# Patient Record
Sex: FEMALE | Race: BLACK OR AFRICAN AMERICAN | Employment: FULL TIME | URBAN - METROPOLITAN AREA
[De-identification: names, ages, dates, MRNs, and addresses within clinical notes are randomized per-mention and may not be internally consistent; named-entity substitution may affect disease eponyms.]

---

## 2017-12-14 ENCOUNTER — OFFICE VISIT (OUTPATIENT)
Dept: INTERNAL MEDICINE CLINIC | Facility: CLINIC | Age: 27
End: 2017-12-14

## 2017-12-14 VITALS
TEMPERATURE: 96.3 F | DIASTOLIC BLOOD PRESSURE: 75 MMHG | OXYGEN SATURATION: 100 % | RESPIRATION RATE: 18 BRPM | BODY MASS INDEX: 26.84 KG/M2 | SYSTOLIC BLOOD PRESSURE: 124 MMHG | HEART RATE: 71 BPM | HEIGHT: 69 IN | WEIGHT: 181.2 LBS

## 2017-12-14 DIAGNOSIS — J01.90 ACUTE NON-RECURRENT SINUSITIS, UNSPECIFIED LOCATION: ICD-10-CM

## 2017-12-14 DIAGNOSIS — J45.21 MILD INTERMITTENT ASTHMA WITH ACUTE EXACERBATION: Primary | ICD-10-CM

## 2017-12-14 DIAGNOSIS — E66.3 OVERWEIGHT (BMI 25.0-29.9): ICD-10-CM

## 2017-12-14 DIAGNOSIS — J30.89 CHRONIC NON-SEASONAL ALLERGIC RHINITIS, UNSPECIFIED TRIGGER: ICD-10-CM

## 2017-12-14 RX ORDER — LORATADINE 10 MG/1
10 TABLET ORAL
COMMUNITY
Start: 2017-12-14 | End: 2018-05-18

## 2017-12-14 RX ORDER — LEVALBUTEROL TARTRATE 45 UG/1
2 AEROSOL, METERED ORAL
Qty: 1 INHALER | Refills: 5 | Status: SHIPPED | OUTPATIENT
Start: 2017-12-14 | End: 2018-05-18

## 2017-12-14 RX ORDER — DOXYCYCLINE 100 MG/1
100 CAPSULE ORAL 2 TIMES DAILY
Qty: 14 CAP | Refills: 0 | Status: SHIPPED | OUTPATIENT
Start: 2017-12-14 | End: 2018-01-29

## 2017-12-14 NOTE — PROGRESS NOTES
Chief Complaint   Patient presents with    New Patient     Patient is Coughing and states she has chest congestion. Room 7     1. Have you been to the ER, urgent care clinic since your last visit? Hospitalized since your last visit? No    2. Have you seen or consulted any other health care providers outside of the [address] [place] [address] since your last visit? Include any pap smears or colon screening.  No     Health Maintenance Due   Topic Date Due    HPV AGE 9Y-34Y (1 of 3 - Female 3 Dose Series) 01/24/2002    DTaP/Tdap/Td series (1 - Tdap) 01/24/2012    PAP AKA CERVICAL CYTOLOGY  01/24/2012    Influenza Age 9 to Adult  08/01/2017

## 2017-12-14 NOTE — PROGRESS NOTES
HISTORY OF PRESENT ILLNESS  Agnes Knowles is a 33 y.o. female. New patient - new to insurance. Found us via insurance's website. Chief Complaint   Patient presents with    New Patient     Patient is Coughing and states she has chest congestion. Room 7     HPI  1) Cough x 1.5 weeks. Started being productive a few days ago. SOB. Nasal congestion. No fever, but sweats occasionally. 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 Not able to exercise regularly without first using albuterol because of asthma. Feeling lightheaded and \"shakey\" after using. 3) Overweight - not exercising regularly because asthma and side effects of albuterol decrease her motivation to exercise. Review of Systems   Constitutional: Positive for malaise/fatigue. Negative for fever. HENT: Positive for congestion. Negative for ear pain. Respiratory: Positive for cough and sputum production. Negative for shortness of breath. Neurological: Positive for headaches. Endo/Heme/Allergies: Positive for environmental allergies. Physical Exam   Constitutional: She is oriented to person, place, and time. She appears well-developed and well-nourished. No distress. HENT:   Head: Normocephalic and atraumatic. Mouth/Throat: Oropharynx is clear and moist.   Bilateral TMs with fluid. No erythema. Nasal mucosa red, inflamed. Eyes: Conjunctivae are normal.   Neck: Neck supple. Cardiovascular: Normal rate, regular rhythm and normal heart sounds. Pulmonary/Chest: Effort normal and breath sounds normal.   Lymphadenopathy:     She has no cervical adenopathy. Neurological: She is alert and oriented to person, place, and time. Skin: Skin is warm and dry. Nursing note and vitals reviewed.       ASSESSMENT and PLAN    ICD-10-CM ICD-9-CM    1. Mild intermittent asthma with acute exacerbation J45.21 493.92 levalbuterol tartrate (XOPENEX) 45 mcg/actuation inhaler   2. Acute non-recurrent sinusitis, unspecified location J01.90 461.9 doxycycline (MONODOX) 100 mg capsule   3. Chronic non-seasonal allergic rhinitis, unspecified trigger J30.89 477.9 loratadine (CLARITIN) 10 mg tablet   4. Overweight (BMI 25.0-29. 9) E66.3 278.02 Discussed diet/exercise and encouraged minor weight loss.

## 2017-12-14 NOTE — MR AVS SNAPSHOT
Visit Information Date & Time Provider Department Dept. Phone Encounter #  
 12/14/2017  9:30 AM Carmelita Hannon, [address] [facility] Internal Medicine [phone] Follow-up Instructions Return for Physical when convenient. Upcoming Health Maintenance Date Due  
 HPV AGE 9Y-34Y (1 of 3 - Female 3 Dose Series) 1/24/2002 DTaP/Tdap/Td series (1 - Tdap) 1/24/2012 PAP AKA CERVICAL CYTOLOGY 1/24/2012 Allergies as of 12/14/2017  Review Complete On: 12/14/2017 By: Carmelita Hannon PA-C Severity Noted Reaction Type Reactions Pcn [Penicillins] High 09/03/2016    Anaphylaxis Banana  09/03/2016    Rash Current Immunizations  Never Reviewed No immunizations on file. Not reviewed this visit You Were Diagnosed With   
  
 Codes Comments Mild intermittent asthma with acute exacerbation    -  Primary ICD-10-CM: J45.21 ICD-9-CM: 305.96 Acute non-recurrent sinusitis, unspecified location     ICD-10-CM: J01.90 ICD-9-CM: 461.9 Chronic non-seasonal allergic rhinitis, unspecified trigger     ICD-10-CM: J30.89 ICD-9-CM: 477.9 Overweight (BMI 25.0-29. 9)     ICD-10-CM: L83.0 ICD-9-CM: 278.02 Vitals BP Pulse Temp Resp Height(growth percentile) Weight(growth percentile) 124/75 (BP 1 Location: Left arm, BP Patient Position: Sitting) 71 96.3 °F (35.7 °C) (Oral) 18 5' 9\" (1.753 m) 181 lb 3.2 oz (82.2 kg) LMP SpO2 BMI OB Status Smoking Status 11/27/2017 100% 26.76 kg/m2 Having regular periods Never Smoker Vitals History BMI and BSA Data Body Mass Index Body Surface Area  
 26.76 kg/m 2 2 m 2 Preferred Pharmacy Pharmacy Name Phone Gabriel Eckert [address] [facility] [phone] Your Updated Medication List  
  
   
This list is accurate as of: 12/14/17  9:53 AM.  Always use your most recent med list.  
  
  
  
  
 doxycycline 100 mg capsule Commonly known as:  Tejal Shed Take 1 Cap by mouth two (2) times a day. levalbuterol tartrate 45 mcg/actuation inhaler Commonly known as:  Shannen Melarase Take 2 Puffs by inhalation every four (4) hours as needed for Shortness of Breath.  
  
 loratadine 10 mg tablet Commonly known as:  Carmel Memphis Take 1 Tab by mouth. Indications: Allergic Rhinitis Prescriptions Sent to Pharmacy Refills  
 levalbuterol tartrate (XOPENEX) 45 mcg/actuation inhaler 5 Sig: Take 2 Puffs by inhalation every four (4) hours as needed for Shortness of Breath. Class: Normal  
 Pharmacy: [facility] [address] [facility] Ph #: [phone] Route: Inhalation  
 doxycycline (MONODOX) 100 mg capsule 0 Sig: Take 1 Cap by mouth two (2) times a day. Class: Normal  
 Pharmacy: [facility] [address] [facility] Ph #: [phone] Route: Oral  
  
Follow-up Instructions Return for Physical when convenient. Introducing [facility] SERVICES! Reji Buckner introduces Red Dot [employer] patient portal. Now you can access parts of your medical record, email your doctor's office, and request medication refills online. 1. In your internet browser, go to [email or web address] 2. Click on the First Time User? Click Here link in the Sign In box. You will see the New Member Sign Up page. 3. Enter your Red Dot Payment Access Code exactly as it appears below. You will not need to use this code after youve completed the sign-up process. If you do not sign up before the expiration date, you must request a new code. · [employer] Access Code: [email or web address] Expires: 3/14/2018  9:53 AM 
 
4. Enter the last four digits of your Social Security Number (xxxx) and Date of Birth (mm/dd/yyyy) as indicated and click Submit. You will be taken to the next sign-up page. 5. Create a People Power ID. This will be your People Power login ID and cannot be changed, so think of one that is secure and easy to remember. 6. Create a People Power password. You can change your password at any time. 7. Enter your Password Reset Question and Answer. This can be used at a later time if you forget your password. 8. Enter your e-mail address. You will receive e-mail notification when new information is available in [address] 9. Click Sign Up. You can now view and download portions of your medical record. 10. Click the Download Summary menu link to download a portable copy of your medical information. If you have questions, please visit the Frequently Asked Questions section of the People Power website. Remember, People Power is NOT to be used for urgent needs. For medical emergencies, dial 911. Now available from your iPhone and Android! Please provide this summary of care documentation to your next provider. Your primary care clinician is listed as NONE. If you have any questions after today's visit, please call [phone].

## 2018-01-29 ENCOUNTER — OFFICE VISIT (OUTPATIENT)
Dept: INTERNAL MEDICINE CLINIC | Facility: CLINIC | Age: 28
End: 2018-01-29

## 2018-01-29 VITALS
HEIGHT: 69 IN | WEIGHT: 178 LBS | TEMPERATURE: 98.6 F | RESPIRATION RATE: 18 BRPM | DIASTOLIC BLOOD PRESSURE: 70 MMHG | SYSTOLIC BLOOD PRESSURE: 106 MMHG | BODY MASS INDEX: 26.36 KG/M2 | HEART RATE: 83 BPM

## 2018-01-29 DIAGNOSIS — L72.3 SEBACEOUS CYST: Primary | ICD-10-CM

## 2018-01-29 DIAGNOSIS — E66.3 OVERWEIGHT (BMI 25.0-29.9): ICD-10-CM

## 2018-01-29 DIAGNOSIS — K13.0 ANGULAR CHEILITIS: ICD-10-CM

## 2018-01-29 DIAGNOSIS — Z12.4 SCREENING FOR CERVICAL CANCER: ICD-10-CM

## 2018-01-29 DIAGNOSIS — N89.8 VAGINAL DISCHARGE: ICD-10-CM

## 2018-01-29 RX ORDER — MUPIROCIN 20 MG/G
OINTMENT TOPICAL DAILY
Qty: 22 G | Refills: 0 | Status: SHIPPED | OUTPATIENT
Start: 2018-01-29 | End: 2018-05-18

## 2018-01-29 NOTE — MR AVS SNAPSHOT
[facility] 
[phone] Patient: Amita Leon MRN: [id number] GML:[birth year] Visit Information Date & Time Provider Department Dept. Phone Encounter #  
 1/29/2018  9:00 AM La Solorzano, [address] Internal Medicine [phone] Follow-up Instructions Return if symptoms worsen or fail to improve. Upcoming Health Maintenance Date Due Pneumococcal 19-64 Medium Risk (1 of 1 - PPSV23) 1/24/2010 DTaP/Tdap/Td series (1 - Tdap) 1/24/2012 PAP AKA CERVICAL CYTOLOGY 1/24/2012 Allergies as of 1/29/2018  Review Complete On: 1/29/2018 By: Maureen Sierra LPN Severity Noted Reaction Type Reactions Pcn [Penicillins] High 09/03/2016    Anaphylaxis Banana  09/03/2016    Rash Current Immunizations  Never Reviewed No immunizations on file. Not reviewed this visit You Were Diagnosed With   
  
 Codes Comments Sebaceous cyst    -  Primary ICD-10-CM: L72.3 ICD-9-CM: 706.2 Screening for cervical cancer     ICD-10-CM: Z12.4 ICD-9-CM: V76.2 Angular cheilitis     ICD-10-CM: K13.0 ICD-9-CM: 528.5 Overweight (BMI 25.0-29. 9)     ICD-10-CM: J51.1 ICD-9-CM: 278.02 Vitals BP Pulse Temp Resp Height(growth percentile) Weight(growth percentile) 106/70 83 98.6 °F (37 °C) (Oral) 18 5' 9\" (1.753 m) 178 lb (80.7 kg) LMP BMI OB Status Smoking Status 01/05/2018 (Approximate) 26.29 kg/m2 Having regular periods Never Smoker Vitals History BMI and BSA Data Body Mass Index Body Surface Area  
 26.29 kg/m 2 1.98 m 2 Preferred Pharmacy Pharmacy Name Phone Sia Sethi [address] [facility] [phone] Your Updated Medication List  
  
   
This list is accurate as of: 1/29/18  9:52 AM.  Always use your most recent med list.  
  
  
  
  
 doxycycline 100 mg capsule Commonly known as:  Truyoan Xavier Take 1 Cap by mouth two (2) times a day. levalbuterol tartrate 45 mcg/actuation inhaler Commonly known as:  Jxa Bene Take 2 Puffs by inhalation every four (4) hours as needed for Shortness of Breath.  
  
 loratadine 10 mg tablet Commonly known as:  Jaime Nicolas Take 1 Tab by mouth. Indications: Allergic Rhinitis  
  
 mupirocin 2 % ointment Commonly known as:  [facility] Apply  to affected area daily. Prescriptions Printed Refills  
 mupirocin (BACTROBAN) 2 % ointment 0 Sig: Apply  to affected area daily. Class: Print Route: Topical  
  
We Performed the Following REFERRAL TO GYNECOLOGY [REF30 Custom] Comments:  
 Or  
[facility] (at [facility]) [facility], [address] Phone: [phone] Or [facility] 
[address] [facility], [address] Phone: [phone] Follow-up Instructions Return if symptoms worsen or fail to improve. Referral Information Referral ID Referred By Referred To  
  
 [id number] Gunjan Lazaro MD   
   [place] [address] [address] Phone: [phone] Fax: [phone] Visits Status Start Date End Date 1 New Request 1/29/18 1/29/19 If your referral has a status of pending review or denied, additional information will be sent to support the outcome of this decision. Introducing [facility] & HEALTH SERVICES! Devora Hawkins introduces Pict[employer] patient portal. Now you can access parts of your medical record, email your doctor's office, and request medication refills online. 1. In your internet browser, go to [email or web address] 2. Click on the First Time User? Click Here link in the Sign In box. You will see the New Member Sign Up page. 3. Enter your Pict[employer] Access Code exactly as it appears below.  You will not need to use this code after youve completed the sign-up process. If you do not sign up before the expiration date, you must request a new code. · Mind FactoryAR Access Code: [email or web address] Expires: 3/14/2018  9:53 AM 
 
4. Enter the last four digits of your Social Security Number (xxxx) and Date of Birth (mm/dd/yyyy) as indicated and click Submit. You will be taken to the next sign-up page. 5. Create a Mind FactoryAR ID. This will be your Mind Factory[employer] login ID and cannot be changed, so think of one that is secure and easy to remember. 6. Create a Mind Factory[employer] password. You can change your password at any time. 7. Enter your Password Reset Question and Answer. This can be used at a later time if you forget your password. 8. Enter your e-mail address. You will receive e-mail notification when new information is available in [address] 9. Click Sign Up. You can now view and download portions of your medical record. 10. Click the Download Summary menu link to download a portable copy of your medical information. If you have questions, please visit the Frequently Asked Questions section of the Mind Factory[employer] website. Remember, Mind Factory[employer] is NOT to be used for urgent needs. For medical emergencies, dial 911. Now available from your iPhone and Android! Please provide this summary of care documentation to your next provider. Your primary care clinician is listed as Veto White. If you have any questions after today's visit, please call [phone].

## 2018-01-29 NOTE — PROGRESS NOTES
Chief Complaint   Patient presents with    Mouth Pain     gets little bumps around her lips that are intermitten. Just randomly appear.  Yeast Infection     believes she may have a yeast infection/keeps getting bumps on pubic area that drain. 1. Have you been to the ER, urgent care clinic since your last visit? Hospitalized since your last visit? No    2. Have you seen or consulted any other health care providers outside of the [address] [place] [address] since your last visit? Include any pap smears or colon screening.  No

## 2018-01-29 NOTE — PROGRESS NOTES
HISTORY OF PRESENT ILLNESS  Tammie White is a 33 y.o. female. Chief Complaint   Patient presents with    Mouth Pain     gets little bumps around her lips that are intermitten. Just randomly appear.  Yeast Infection     believes she may have a yeast infection/keeps getting bumps on pubic area that drain. HPI  1) Mouth pain - dryness corners of mouth - vasaline didn't help - now \"sores\" lip margin. Slightly beter today, Bumps last 3-4 days. No discharge/weeping, hard and painful. 2) Small \"Bumps\" B external labia. Bumps are painful until they \"pop\". Getting white discharge from bumps. Removing hair with a combination of Phoebe Senna and Wet razor/shaving cream. No recent change in grooming habits/products. \"Menstrual pain\" trhought the month without relation to ovulation. No vaginal discharge currently. Never sexually active with penile/vaginal intercourse. Has never had pap smear. Very anxious about pelvic exam.     3) Exercising more and changed eating habits - not eating beef/pork (and has thus cut down on meat in diet) and soda/juice. Has lost 3 lbs since last visit! Wt Readings from Last 3 Encounters:   01/29/18 178 lb (80.7 kg)   12/14/17 181 lb 3.2 oz (82.2 kg)   09/03/16 200 lb 12.8 oz (91.1 kg)       Review of Systems   Constitutional: Negative for fever. Genitourinary: Negative for dysuria, flank pain, frequency, hematuria and urgency. Skin: Negative for itching and rash. Physical Exam   Constitutional: She is oriented to person, place, and time. She appears well-developed and well-nourished. No distress. HENT:   Head: Normocephalic and atraumatic. Neck: Neck supple. No JVD present. Cardiovascular: Normal rate, regular rhythm and normal heart sounds. Pulmonary/Chest: Effort normal and breath sounds normal. No respiratory distress. Genitourinary: No vaginal discharge found. Genitourinary Comments: Pt points to a resolving ingrown hair bump L external labia.  Very mild inflammation. No ulceration or fluctuance. Speculum exam attempted with small speculum, but pt's discomfort & anxiety was too great to insert speculum and do pap. External exam is unremarkable. Vaginal swab done to check for BV/Yeast/STDs. Musculoskeletal: She exhibits no edema. Neurological: She is alert and oriented to person, place, and time. Skin: Skin is warm and dry. Angles of mouth B red, irritated. No ulceration. Lips and vermilion borders dry & scaling. Psychiatric: She has a normal mood and affect. Her behavior is normal. Judgment and thought content normal.   Nursing note and vitals reviewed. ASSESSMENT and PLAN    ICD-10-CM ICD-9-CM    1. Sebaceous cyst L72.3 706.2 mupirocin (BACTROBAN) 2 % ointment    Discussed hair removal techniques to prevent ingrown hairs. 2. Screening for cervical cancer Z12.4 V76.2 REFERRAL TO GYNECOLOGY   3. Angular cheilitis K13.0 528.5 Moisturize. Restart meat in diet. MVI daily. 4. Overweight (BMI 25.0-29. 9) E66.3 278.02 Congratulated pt on excellent progress! 5.  Vaginal discharge N89.8 623.5 NUSWAB VAGINITIS PLUS

## 2018-02-01 ENCOUNTER — TELEPHONE (OUTPATIENT)
Dept: INTERNAL MEDICINE CLINIC | Facility: CLINIC | Age: 28
End: 2018-02-01

## 2018-02-01 LAB
A VAGINAE DNA VAG QL NAA+PROBE: ABNORMAL SCORE
BVAB2 DNA VAG QL NAA+PROBE: ABNORMAL SCORE
C ALBICANS DNA VAG QL NAA+PROBE: NEGATIVE
C GLABRATA DNA VAG QL NAA+PROBE: NEGATIVE
C TRACH RRNA SPEC QL NAA+PROBE: NEGATIVE
MEGA1 DNA VAG QL NAA+PROBE: ABNORMAL SCORE
N GONORRHOEA RRNA SPEC QL NAA+PROBE: NEGATIVE
T VAGINALIS RRNA SPEC QL NAA+PROBE: NEGATIVE

## 2018-02-01 NOTE — TELEPHONE ENCOUNTER
Called pt. Informed swab \"indeterminate\" for BV - pt is not having any vaginal discharge sx and she did not appear to have vaginitis on exam. We will defer treating this unless pt develops sx. She agrees. Reviewed the remainder of pt's normal swab results.

## 2018-02-19 ENCOUNTER — TELEPHONE (OUTPATIENT)
Dept: INTERNAL MEDICINE CLINIC | Facility: CLINIC | Age: 28
End: 2018-02-19

## 2018-02-19 NOTE — TELEPHONE ENCOUNTER
Pt returned called. I informed pt that I was calling in to check on her. She stated that she went to  in [place], [place] for the chest pains. She stated that her ribcage was inflamed. She was given a steroid and Ibuprofen. She is feeling better. Pt advised to call the office by end of week if the pain is not better. Pt voiced understanding.  Everardo Carolina LPN

## 2018-02-19 NOTE — TELEPHONE ENCOUNTER
Patient called on call service for chest pain, pain with deep breath in. She was advised to go to the nearest urgent care or ED for chest xray, patient stated understanding.

## 2018-05-08 ENCOUNTER — OFFICE VISIT (OUTPATIENT)
Dept: INTERNAL MEDICINE CLINIC | Facility: CLINIC | Age: 28
End: 2018-05-08

## 2018-05-08 VITALS
BODY MASS INDEX: 26.66 KG/M2 | HEIGHT: 69 IN | SYSTOLIC BLOOD PRESSURE: 119 MMHG | HEART RATE: 80 BPM | WEIGHT: 180 LBS | DIASTOLIC BLOOD PRESSURE: 82 MMHG | TEMPERATURE: 98.6 F | OXYGEN SATURATION: 100 % | RESPIRATION RATE: 18 BRPM

## 2018-05-08 DIAGNOSIS — J45.21 MILD INTERMITTENT ASTHMA WITH ACUTE EXACERBATION: ICD-10-CM

## 2018-05-08 DIAGNOSIS — R05.9 COUGH: ICD-10-CM

## 2018-05-08 DIAGNOSIS — J30.89 CHRONIC NON-SEASONAL ALLERGIC RHINITIS: Primary | ICD-10-CM

## 2018-05-08 RX ORDER — ALBUTEROL SULFATE 0.83 MG/ML
2.5 SOLUTION RESPIRATORY (INHALATION) ONCE
Qty: 1 EACH | Refills: 0
Start: 2018-05-08 | End: 2018-05-08

## 2018-05-08 RX ORDER — BUDESONIDE AND FORMOTEROL FUMARATE DIHYDRATE 80; 4.5 UG/1; UG/1
2 AEROSOL RESPIRATORY (INHALATION) 2 TIMES DAILY
Qty: 1 INHALER | Refills: 1 | Status: SHIPPED | OUTPATIENT
Start: 2018-05-08 | End: 2019-10-08 | Stop reason: SDUPTHER

## 2018-05-08 RX ORDER — METHYLPREDNISOLONE 4 MG/1
TABLET ORAL
Qty: 1 DOSE PACK | Refills: 0 | Status: SHIPPED | OUTPATIENT
Start: 2018-05-08 | End: 2018-05-14

## 2018-05-08 RX ORDER — MINERAL OIL
180 ENEMA (ML) RECTAL DAILY
Qty: 30 TAB | Refills: 2 | Status: SHIPPED | OUTPATIENT
Start: 2018-05-08 | End: 2018-07-29

## 2018-05-08 RX ORDER — ALBUTEROL SULFATE 90 UG/1
AEROSOL, METERED RESPIRATORY (INHALATION)
COMMUNITY
End: 2020-11-02 | Stop reason: ALTCHOICE

## 2018-05-08 RX ORDER — AZITHROMYCIN 250 MG/1
250 TABLET, FILM COATED ORAL SEE ADMIN INSTRUCTIONS
Qty: 6 TAB | Refills: 0 | Status: SHIPPED | OUTPATIENT
Start: 2018-05-08 | End: 2018-05-13

## 2018-05-08 NOTE — PROGRESS NOTES
Subjective:      Umm Sifuentes is a 33 y.o. female who presents today for   Chief Complaint   Patient presents with    Cough     productive hacking cough that has worsened over the last week. Patient has past med hx of asthma. She says symptoms started week and half ago. Taking Claritin and zyrtec. She says cough has become progressively worse. She felt like she was wheezing. She has been using her albuterol much more frequently than usual. Typically she rarely uses her albuterol. She says she is coughing up a lot of mucus. Patient Active Problem List    Diagnosis Date Noted    Chronic non-seasonal allergic rhinitis 12/14/2017    Mild intermittent asthma with acute exacerbation 12/14/2017    Overweight (BMI 25.0-29.9) 12/14/2017     Current Outpatient Prescriptions   Medication Sig Dispense Refill    Cetirizine (ZYRTEC) 10 mg cap Take  by mouth.  iron/folic OHPF/R68/F/PJUNLKJM (FERRALET 90 PO) Take  by mouth.  albuterol (PROVENTIL HFA, VENTOLIN HFA, PROAIR HFA) 90 mcg/actuation inhaler Take  by inhalation.  loratadine (CLARITIN) 10 mg tablet Take 1 Tab by mouth. Indications: Allergic Rhinitis      mupirocin (BACTROBAN) 2 % ointment Apply  to affected area daily. 22 g 0    levalbuterol tartrate (XOPENEX) 45 mcg/actuation inhaler Take 2 Puffs by inhalation every four (4) hours as needed for Shortness of Breath. 1 Inhaler 5     Allergies   Allergen Reactions    Pcn [Penicillins] Anaphylaxis    Banana Rash     Past Medical History:   Diagnosis Date    Allergic rhinitis     Asthma      No past surgical history on file.   Family History   Problem Relation Age of Onset    Diabetes Mother      decsd at age 48 s/p cellulitis and amputation    Heart Failure Mother     Kidney Disease Mother     No Known Problems Father      not close    Diabetes Maternal Grandmother     Kidney Disease Maternal Grandmother      Social History   Substance Use Topics    Smoking status: Never Smoker  Smokeless tobacco: Never Used    Alcohol use No        Review of Systems    A comprehensive review of systems was negative except for that written in the HPI. Objective:     Visit Vitals    /82    Pulse 80    Temp 98.6 °F (37 °C) (Oral)    Resp 18    Ht 5' 9\" (1.753 m)    Wt 180 lb (81.6 kg)    LMP 04/28/2018    SpO2 100%    BMI 26.58 kg/m2     General:  Alert, cooperative, no distress, appears stated age. Head:  Normocephalic, without obvious abnormality, atraumatic. Eyes:  Conjunctivae/corneas clear. PERRL, EOMs intact. Fundi benign. Ears:  Normal TMs and external ear canals both ears. Nose: Nares normal. Septum midline. Mucosa normal. No drainage or sinus tenderness. Throat: Lips, mucosa, and tongue normal. Teeth and gums normal.   Neck: Supple, symmetrical, trachea midline, no adenopathy, thyroid: no enlargement/tenderness/nodules, no carotid bruit and no JVD. Back:   Symmetric, no curvature. ROM normal. No CVA tenderness. Lungs:   Clear to auscultation bilaterally. Chest wall:  No tenderness or deformity. Heart:  Regular rate and rhythm, S1, S2 normal, no murmur, click, rub or gallop. Abdomen:   Soft, non-tender. Bowel sounds normal. No masses,  No organomegaly. Extremities: Extremities normal, atraumatic, no cyanosis or edema. Pulses: 2+ and symmetric all extremities. Skin: Skin color, texture, turgor normal. No rashes or lesions. Lymph nodes: Cervical, supraclavicular, and axillary nodes normal.   Neurologic: CNII-XII intact. Normal strength, sensation and reflexes throughout. Assessment/Plan:       ICD-10-CM ICD-9-CM    1. Chronic non-seasonal allergic rhinitis J30.89 477.9 Try allegra   2. Mild intermittent asthma with acute exacerbation J45.21 493.92 ALBUTEROL, INHAL. SOL., FDA-APPROVED FINAL, NON-COMPOUND UNIT DOSE, 1 MG      INHAL RX, AIRWAY OBST/DX SPUTUM INDUCT      Albuterol as needed  symbicort daily  Medrol pack   3.  Cough R05 786.2 ALBUTEROL, INHAL. SOL., FDA-APPROVED FINAL, NON-COMPOUND UNIT DOSE, 1 MG      INHAL RX, AIRWAY OBST/DX SPUTUM INDUCT       Follow-up Disposition: Not on File   Advised her to call back or return to office if symptoms worsen/change/persist.  Discussed expected course/resolution/complications of diagnosis in detail with patient. Medication risks/benefits/costs/interactions/alternatives discussed with patient. She was given an after visit summary which includes diagnoses, current medications, & vitals. She expressed understanding with the diagnosis and plan.

## 2018-05-08 NOTE — PROGRESS NOTES
Chief Complaint   Patient presents with    Cough     productive hacking cough that has worsened over the last week. 1. Have you been to the ER, urgent care clinic since your last visit? Hospitalized since your last visit? No    2. Have you seen or consulted any other health care providers outside of the [facility] since your last visit? Include any pap smears or colon screening.  No

## 2018-05-08 NOTE — MR AVS SNAPSHOT
[address] [facility] 
[phone] Patient: Kumar Meraz MRN: [id number] BMF:8/73/3372 Visit Information Date & Time Provider Department Dept. Phone Encounter #  
 5/8/2018  8:00 AM Vic Gordon  [facility] Internal Medicine [phone] [id number] Follow-up Instructions Return if symptoms worsen or fail to improve, for follow up. Upcoming Health Maintenance Date Due Pneumococcal 19-64 Medium Risk (1 of 1 - PPSV23) 1/24/2010 DTaP/Tdap/Td series (1 - Tdap) 1/24/2012 PAP AKA CERVICAL CYTOLOGY 1/24/2012 Influenza Age 5 to Adult 8/1/2018 Allergies as of 5/8/2018  Review Complete On: 5/8/2018 By: Yesy Phillips LPN Severity Noted Reaction Type Reactions Pcn [Penicillins] High 09/03/2016    Anaphylaxis Banana  09/03/2016    Rash Current Immunizations  Never Reviewed No immunizations on file. Not reviewed this visit You Were Diagnosed With   
  
 Codes Comments Chronic non-seasonal allergic rhinitis    -  Primary ICD-10-CM: J30.89 ICD-9-CM: 477.9 Mild intermittent asthma with acute exacerbation     ICD-10-CM: J45.21 ICD-9-CM: 396.24 Cough     ICD-10-CM: R05 ICD-9-CM: 937. 2 Vitals BP Pulse Temp Resp Height(growth percentile) Weight(growth percentile) 119/82 80 98.6 °F (37 °C) (Oral) 18 5' 9\" (1.753 m) 180 lb (81.6 kg) LMP SpO2 BMI OB Status Smoking Status 04/28/2018 100% 26.58 kg/m2 Having regular periods Never Smoker Vitals History BMI and BSA Data Body Mass Index Body Surface Area  
 26.58 kg/m 2 1.99 m 2 Preferred Pharmacy Pharmacy Name Phone Ana Thompson [id number] [facility] [phone] Your Updated Medication List  
  
   
This list is accurate as of 5/8/18  8:53 AM.  Always use your most recent med list.  
  
  
  
  
 * albuterol 90 mcg/actuation inhaler Commonly known as:  PROVENTIL HFA, VENTOLIN HFA, PROAIR HFA Take  by inhalation. * albuterol 2.5 mg /3 mL (0.083 %) nebulizer solution Commonly known as:  PROVENTIL VENTOLIN  
3 mL by Nebulization route once for 1 dose. azithromycin 250 mg tablet Commonly known as:  Nir Ship Take 1 Tab by mouth See Admin Instructions for 5 days. budesonide-formoterol 80-4.5 mcg/actuation Hfaa Commonly known as:  SYMBICORT Take 2 Puffs by inhalation two (2) times a day. FERRALET 90 PO Take  by mouth. fexofenadine 180 mg tablet Commonly known as:  Lennis Ruiz Take 1 Tab by mouth daily. levalbuterol tartrate 45 mcg/actuation inhaler Commonly known as:  Jazmin Nab Take 2 Puffs by inhalation every four (4) hours as needed for Shortness of Breath.  
  
 loratadine 10 mg tablet Commonly known as:  Curlee Arms Take 1 Tab by mouth. Indications: Allergic Rhinitis  
  
 methylPREDNISolone 4 mg tablet Commonly known as:  MEDROL DOSEPACK  
use as directed  
  
 mupirocin 2 % ointment Commonly known as:  [facility] Apply  to affected area daily. ZyrTEC 10 mg Cap Generic drug:  Cetirizine Take  by mouth. * Notice: This list has 2 medication(s) that are the same as other medications prescribed for you. Read the directions carefully, and ask your doctor or other care provider to review them with you. Prescriptions Sent to Pharmacy Refills  
 fexofenadine (ALLEGRA) 180 mg tablet 2 Sig: Take 1 Tab by mouth daily. Class: Normal  
 Pharmacy: [address] Ph #: [phone] Route: Oral  
 azithromycin (ZITHROMAX) 250 mg tablet 0 Sig: Take 1 Tab by mouth See Admin Instructions for 5 days. Class: Normal  
 Pharmacy: [address] Ph #: [phone]  Route: Oral  
 budesonide-formoterol (SYMBICORT) 80-4.5 mcg/actuation HFAA 1 Sig: Take 2 Puffs by inhalation two (2) times a day. Class: Normal  
 [facility] [address] [facility] Ph #: [phone] Route: Inhalation  
 methylPREDNISolone (MEDROL DOSEPACK) 4 mg tablet 0 Sig: use as directed Class: Normal  
 Pharmacy: [facility] [address] [facility] Ph #: [phone] We Performed the Following ALBUTEROL, INHAL. SOL., FDA-APPROVED FINAL, NON-COMPOUND UNIT DOSE, 1 MG [ [facility]] INHAL RX, AIRWAY OBST/DX SPUTUM INDUCT [id number] CPT(R)] Follow-up Instructions Return if symptoms worsen or fail to improve, for follow up. Introducing [facility] SERVICES! Sánchez Benz introduces [employer] patient portal. Now you can access parts of your medical record, email your doctor's office, and request medication refills online. 1. In your internet browser, go to [email or web address] 2. Click on the First Time User? Click Here link in the Sign In box. You will see the New Member Sign Up page. 3. Enter your [employer] Access Code exactly as it appears below. You will not need to use this code after youve completed the sign-up process. If you do not sign up before the expiration date, you must request a new code. · [employer] Access Code: [email or web address] Expires: 8/6/2018  8:53 AM 
 
4. Enter the last four digits of your Social Security Number (xxxx) and Date of Birth (mm/dd/yyyy) as indicated and click Submit. You will be taken to the next sign-up page. 5. Create a myDrugCostst ID. This will be your Q1Media login ID and cannot be changed, so think of one that is secure and easy to remember. 6. Create a [employer] password. You can change your password at any time. 7. Enter your Password Reset Question and Answer. This can be used at a later time if you forget your password. 8. Enter your e-mail address. You will receive e-mail notification when new information is available in [address] 9. Click Sign Up. You can now view and download portions of your medical record. 10. Click the Download Summary menu link to download a portable copy of your medical information. If you have questions, please visit the Frequently Asked Questions section of the [employer] website. Remember, [employer] is NOT to be used for urgent needs. For medical emergencies, dial 911. Now available from your iPhone and Android! Please provide this summary of care documentation to your next provider. Your primary care clinician is listed as Angle Journey. If you have any questions after today's visit, please call [phone].

## 2018-07-27 ENCOUNTER — OFFICE VISIT (OUTPATIENT)
Dept: INTERNAL MEDICINE CLINIC | Facility: CLINIC | Age: 28
End: 2018-07-27

## 2018-07-27 VITALS
RESPIRATION RATE: 18 BRPM | HEART RATE: 85 BPM | WEIGHT: 177 LBS | SYSTOLIC BLOOD PRESSURE: 115 MMHG | DIASTOLIC BLOOD PRESSURE: 79 MMHG | BODY MASS INDEX: 26.83 KG/M2 | HEIGHT: 68 IN | TEMPERATURE: 97.1 F

## 2018-07-27 DIAGNOSIS — D50.9 IRON DEFICIENCY ANEMIA, UNSPECIFIED IRON DEFICIENCY ANEMIA TYPE: Primary | ICD-10-CM

## 2018-07-27 DIAGNOSIS — R10.9 RIGHT FLANK PAIN: ICD-10-CM

## 2018-07-27 LAB
BILIRUB UR QL STRIP: NEGATIVE
GLUCOSE UR-MCNC: NEGATIVE MG/DL
KETONES P FAST UR STRIP-MCNC: NEGATIVE MG/DL
PH UR STRIP: 6.5 [PH] (ref 4.6–8)
PROT UR QL STRIP: NEGATIVE
SP GR UR STRIP: 1.02 (ref 1–1.03)
UA UROBILINOGEN AMB POC: NORMAL (ref 0.2–1)
URINALYSIS CLARITY POC: CLEAR
URINALYSIS COLOR POC: YELLOW
URINE BLOOD POC: NORMAL
URINE LEUKOCYTES POC: NORMAL
URINE NITRITES POC: NEGATIVE

## 2018-07-27 RX ORDER — NITROFURANTOIN 25; 75 MG/1; MG/1
100 CAPSULE ORAL 2 TIMES DAILY
Qty: 14 CAP | Refills: 0 | Status: SHIPPED | OUTPATIENT
Start: 2018-07-27 | End: 2019-01-09 | Stop reason: ALTCHOICE

## 2018-07-27 NOTE — MR AVS SNAPSHOT
Vijaya Ji 
 
 
 [facility] 
[phone] Patient: Jenn Bolivar MRN: [id number] [facility]:[birth year] Visit Information Date & Time Provider Department Dept. Phone Encounter #  
 7/27/2018  9:30 AM Elvin Powers MD [address] [place] [address] Internal Medicine [phone] [id number] Follow-up Instructions Return if symptoms worsen or fail to improve, for follow up. Upcoming Health Maintenance Date Due Pneumococcal 19-64 Medium Risk (1 of 1 - PPSV23) 1/24/2010 DTaP/Tdap/Td series (1 - Tdap) 1/24/2012 PAP AKA CERVICAL CYTOLOGY 1/24/2012 Influenza Age 5 to Adult 8/1/2018 Allergies as of 7/27/2018  Review Complete On: 7/27/2018 By: Daphne Dodd LPN Severity Noted Reaction Type Reactions Pcn [Penicillins] High 09/03/2016    Anaphylaxis Banana  09/03/2016    Rash Current Immunizations  Never Reviewed No immunizations on file. Not reviewed this visit You Were Diagnosed With   
  
 Codes Comments Iron deficiency anemia, unspecified iron deficiency anemia type    -  Primary ICD-10-CM: D50.9 ICD-9-CM: 280.9 Right flank pain     ICD-10-CM: R10.9 ICD-9-CM: 789.09 Vitals BP Pulse Temp Resp Height(growth percentile) Weight(growth percentile) 115/79 85 97.1 °F (36.2 °C) (Oral) 18 5' 8\" (1.727 m) 177 lb (80.3 kg) LMP BMI OB Status Smoking Status 07/11/2018 (Approximate) 26.91 kg/m2 Having regular periods Never Smoker Vitals History BMI and BSA Data Body Mass Index Body Surface Area  
 26.91 kg/m 2 1.96 m 2 Preferred Pharmacy Pharmacy Name Phone Dixie Gilmore [address] [facility] [phone] Your Updated Medication List  
  
   
This list is accurate as of 7/27/18 10:40 AM.  Always use your most recent med list.  
  
  
  
  
 albuterol 90 mcg/actuation inhaler Commonly known as:  PROVENTIL HFA, VENTOLIN HFA, PROAIR HFA Take  by inhalation. budesonide-formoterol 80-4.5 mcg/actuation Hfaa Commonly known as:  SYMBICORT Take 2 Puffs by inhalation two (2) times a day. FERRALET 90 PO Take  by mouth. fexofenadine 180 mg tablet Commonly known as:  Avonne Day Take 1 Tab by mouth daily. Iron, Cbn & Gluc-FA-B12-C-DSS 90-1-12-50 mg-mg-mcg-mg Tab Commonly known as:  FERRALET 90 DUAL-IRON DELIVERY Take 1 Each by mouth daily. nitrofurantoin (macrocrystal-monohydrate) 100 mg capsule Commonly known as:  MACROBID Take 1 Cap by mouth two (2) times a day. Prescriptions Sent to Pharmacy Refills Iron, Cbn & Gluc-FA-B12-C-DSS (FERRALET 90 DUAL-IRON DELIVERY) 90-1-12-50 mg-mg-mcg-mg tab 6 Sig: Take 1 Each by mouth daily. Class: Normal  
 Pharmacy: [facility] [address], [place] Ph #: [phone] Route: Oral  
 nitrofurantoin, macrocrystal-monohydrate, (MACROBID) 100 mg capsule 0 Sig: Take 1 Cap by mouth two (2) times a day. Class: Normal  
 Pharmacy: [facility] [address], [place] Ph #: [phone] Route: Oral  
  
We Performed the Following AMB POC URINALYSIS DIP STICK AUTO W/O MICRO [48286 CPT(R)] CULTURE, URINE [id number] CPT(R)] Follow-up Instructions Return if symptoms worsen or fail to improve, for follow up. Introducing [facility] & HEALTH SERVICES! Xavier Martinez introduces Twitter patient portal. Now you can access parts of your medical record, email your doctor's office, and request medication refills online. 1. In your internet browser, go to [email or web address] 2. Click on the First Time User? Click Here link in the Sign In box. You will see the New Member Sign Up page. 3. Enter your Twitter Access Code exactly as it appears below.  You will not need to use this code after youve completed the sign-up process. If you do not sign up before the expiration date, you must request a new code. · [employer] Access Code: [email or web address] Expires: 8/6/2018  8:53 AM 
 
4. Enter the last four digits of your Social Security Number (xxxx) and Date of Birth (mm/dd/yyyy) as indicated and click Submit. You will be taken to the next sign-up page. 5. Create a [employer] ID. This will be your [employer] login ID and cannot be changed, so think of one that is secure and easy to remember. 6. Create a [employer] password. You can change your password at any time. 7. Enter your Password Reset Question and Answer. This can be used at a later time if you forget your password. 8. Enter your e-mail address. You will receive e-mail notification when new information is available in [address] 9. Click Sign Up. You can now view and download portions of your medical record. 10. Click the Download Summary menu link to download a portable copy of your medical information. If you have questions, please visit the Frequently Asked Questions section of the [employer] website. Remember, [employer] is NOT to be used for urgent needs. For medical emergencies, dial 911. Now available from your iPhone and Android! Please provide this summary of care documentation to your next provider. Your primary care clinician is listed as Wendie Rene. If you have any questions after today's visit, please call [phone].

## 2018-07-27 NOTE — PROGRESS NOTES
Subjective:      Endy Miller is a 33 y.o. female who presents today for   Chief Complaint   Patient presents with    Medication Evaluation    Flank Pain     bilateral flank pain. Iron deficiency anemia- has seen hematologist  She takes one iron supplement daily    Patient concerned about rt sided flank pian, comes and goes, pain is sharp and appears to be getting worse, no fever or chills. Patient Active Problem List    Diagnosis Date Noted    Chronic non-seasonal allergic rhinitis 12/14/2017    Mild intermittent asthma with acute exacerbation 12/14/2017    Overweight (BMI 25.0-29.9) 12/14/2017     Current Outpatient Prescriptions   Medication Sig Dispense Refill    iron/folic LBDL/A64/Z/DFARTCXX (FERRALET 90 PO) Take  by mouth.  albuterol (PROVENTIL HFA, VENTOLIN HFA, PROAIR HFA) 90 mcg/actuation inhaler Take  by inhalation.  budesonide-formoterol (SYMBICORT) 80-4.5 mcg/actuation HFAA Take 2 Puffs by inhalation two (2) times a day. 1 Inhaler 1    fexofenadine (ALLEGRA) 180 mg tablet Take 1 Tab by mouth daily. 30 Tab 2     Allergies   Allergen Reactions    Pcn [Penicillins] Anaphylaxis    Banana Rash     Past Medical History:   Diagnosis Date    Allergic rhinitis     Asthma      No past surgical history on file. Family History   Problem Relation Age of Onset    Diabetes Mother      decsd at age 48 s/p cellulitis and amputation    Heart Failure Mother     Kidney Disease Mother     No Known Problems Father      not close    Diabetes Maternal Grandmother     Kidney Disease Maternal Grandmother      Social History   Substance Use Topics    Smoking status: Never Smoker    Smokeless tobacco: Never Used    Alcohol use No        Review of Systems    A comprehensive review of systems was negative except for that written in the HPI.      Objective:     Visit Vitals    /79    Pulse 85    Temp 97.1 °F (36.2 °C) (Oral)    Resp 18    Ht 5' 8\" (1.727 m)    Wt 177 lb (80.3 kg)    LMP 07/11/2018 (Approximate)    BMI 26.91 kg/m2     General:  Alert, cooperative, no distress, appears stated age. Head:  Normocephalic, without obvious abnormality, atraumatic. Eyes:  Conjunctivae/corneas clear. PERRL, EOMs intact. Fundi benign. Ears:  Normal TMs and external ear canals both ears. Nose: Nares normal. Septum midline. Mucosa normal. No drainage or sinus tenderness. Throat: Lips, mucosa, and tongue normal. Teeth and gums normal.   Neck: Supple, symmetrical, trachea midline, no adenopathy, thyroid: no enlargement/tenderness/nodules, no carotid bruit and no JVD. Back:   Symmetric, no curvature. ROM normal. No CVA tenderness. Lungs:   Clear to auscultation bilaterally. Chest wall:  No tenderness or deformity. Heart:  Regular rate and rhythm, S1, S2 normal, no murmur, click, rub or gallop. Abdomen:   Soft, non-tender. Bowel sounds normal. No masses,  No organomegaly. Assessment/Plan:       ICD-10-CM ICD-9-CM    1. Iron deficiency anemia, unspecified iron deficiency anemia type D50.9 280.9 Refill prescription iron supplement   2. Right flank pain    UTI vs stone vs muscle spasm R10.9 789.09 AMB POC URINALYSIS DIP STICK AUTO W/O MICRO      CULTURE, URINE  Appears to be a mild UTI  Will treat empirically with macrobid and send culture  Increase water intake    If symptoms exacerbate or develops fever go to ER       Follow-up Disposition: Not on File   Advised her to call back or return to office if symptoms worsen/change/persist.  Discussed expected course/resolution/complications of diagnosis in detail with patient. Medication risks/benefits/costs/interactions/alternatives discussed with patient. She was given an after visit summary which includes diagnoses, current medications, & vitals. She expressed understanding with the diagnosis and plan.

## 2018-07-27 NOTE — PROGRESS NOTES
Chief Complaint   Patient presents with    Medication Evaluation    Flank Pain     bilateral flank pain. 1. Have you been to the ER, urgent care clinic since your last visit? Hospitalized since your last visit? No    2. Have you seen or consulted any other health care providers outside of the [address] [place] [address] since your last visit? Include any pap smears or colon screening.  No

## 2018-07-30 LAB
BACTERIA UR CULT: ABNORMAL
BACTERIA UR CULT: ABNORMAL

## 2018-12-19 ENCOUNTER — OFFICE VISIT (OUTPATIENT)
Dept: PRIMARY CARE CLINIC | Age: 28
End: 2018-12-19

## 2018-12-19 VITALS
HEIGHT: 68 IN | TEMPERATURE: 98 F | OXYGEN SATURATION: 99 % | WEIGHT: 186.6 LBS | SYSTOLIC BLOOD PRESSURE: 106 MMHG | BODY MASS INDEX: 28.28 KG/M2 | DIASTOLIC BLOOD PRESSURE: 71 MMHG | HEART RATE: 99 BPM | RESPIRATION RATE: 18 BRPM

## 2018-12-19 DIAGNOSIS — J45.20 MILD INTERMITTENT ASTHMA WITHOUT COMPLICATION: ICD-10-CM

## 2018-12-19 DIAGNOSIS — R10.13 EPIGASTRIC PAIN: Primary | ICD-10-CM

## 2018-12-19 DIAGNOSIS — R42 DIZZINESS: ICD-10-CM

## 2018-12-19 DIAGNOSIS — D64.9 ANEMIA, UNSPECIFIED TYPE: ICD-10-CM

## 2018-12-19 RX ORDER — OMEPRAZOLE 40 MG/1
40 CAPSULE, DELAYED RELEASE ORAL DAILY
Qty: 30 CAP | Refills: 0 | Status: SHIPPED | OUTPATIENT
Start: 2018-12-19 | End: 2019-01-18

## 2018-12-19 NOTE — PROGRESS NOTES
Chief Complaint   Patient presents with    Other     chest pressure for 2 years(EKG from 2016 in computer)     Patient went to [facility]/15/18 last week for SOB and chest \"pulling\". She was given a breathing treatment . The symptoms are worse in the morning. Visit Vitals  /71 (BP 1 Location: Left arm, BP Patient Position: Sitting)   Pulse 99   Temp 98 °F (36.7 °C) (Oral)   Resp 18   Ht 5' 8\" (1.727 m)   Wt 186 lb 9.6 oz (84.6 kg)   SpO2 99%   BMI 28.37 kg/m²     1. Have you been to the ER, urgent care clinic since your last visit? Hospitalized since your last visit? [facility] 12/15/18    2. Have you seen or consulted any other health care providers outside of the [address] [place] [address] since your last visit? Include any pap smears or colon screening. no

## 2018-12-19 NOTE — PROGRESS NOTES
Written by Bozena Carty, as dictated by Dr. Gallo Doyle MD.    Billy Reaves is a 33 y.o. female. HPI  The patient comes in today to establish care. Patient has been experiencing chest pain for almost 2 years and her other doctors are not taking her seriously. She described the pain as a pulling sensation which she experiences almost every morning. When the pain is sharp it feels like a shock and normally starts on the L side. About 1 year ago she tried not wearing a bra when sleeping which did not provide relief. The patient notes that the pain is presents during the day, but is not as noticeable. Recently she has been feeling the sharp pain more frequently. She was prescribed steroids for 1 month and did not experience relief. The patient has seen multiple doctors and was only prescribed steroids. Patient has not taken Prilosec. She has asthma and takes her inhaler as needed. Her menstrual cycle is heavy but has been anemic before she started menstruating. She is not sure if her menstrual cycle worsened her anemia. Patient does not know what type of anemia she has and does not believe she has had testing to determine the cause. Over the past week she has been feeling faint. Usually the dizziness is when she stands up, but sometimes she experiences dizziness while standing. She thought it was due to not eating well, but she has not experienced relief since she started eating more. Last blood work was done about 3 months ago by her hematologist. Her hematologist prescribed iron tablets. The patient drinks about 16 oz of water daily. She received a flu shout about 1 month ago. Patient is a teacher at [employer]. Patient Active Problem List   Diagnosis Code    Chronic non-seasonal allergic rhinitis J30.89    Mild intermittent asthma with acute exacerbation J45.21    Overweight (BMI 25.0-29. 9) E66.3        Current Outpatient Medications on File Prior to Visit   Medication Sig Dispense Refill    Iron, Cbn & Gluc-FA-B12-C-DSS (FERRALET 90 DUAL-IRON DELIVERY) 90-1-12-50 mg-mg-mcg-mg tab Take 1 Each by mouth daily. 30 Tab 6    albuterol (PROVENTIL HFA, VENTOLIN HFA, PROAIR HFA) 90 mcg/actuation inhaler Take  by inhalation.  nitrofurantoin, macrocrystal-monohydrate, (MACROBID) 100 mg capsule Take 1 Cap by mouth two (2) times a day. 14 Cap 0    iron/folic TFXU/E58/W/MWUBUFVH (FERRALET 90 PO) Take  by mouth.  budesonide-formoterol (SYMBICORT) 80-4.5 mcg/actuation HFAA Take 2 Puffs by inhalation two (2) times a day. 1 Inhaler 1     No current facility-administered medications on file prior to visit. Allergies   Allergen Reactions    Pcn [Penicillins] Anaphylaxis    Banana Rash       Past Medical History:   Diagnosis Date    Allergic rhinitis     Asthma        Family History   Problem Relation Age of Onset    Diabetes Mother         decsd at age 48 s/p cellulitis and amputation    Heart Failure Mother     Kidney Disease Mother     No Known Problems Father         not close    Diabetes Maternal Grandmother     Kidney Disease Maternal Grandmother        Social History     Socioeconomic History    Marital status: SINGLE     Spouse name: Not on file    Number of children: 0    Years of education: Not on file    Highest education level: Not on file   Social Needs    Financial resource strain: Not on file    Food insecurity - worry: Not on file    Food insecurity - inability: Not on file   [employer] needs - medical: Not on file   [employer] needs - non-medical: Not on file   Occupational History    Occupation:    Tobacco Use    Smoking status: Never Smoker    Smokeless tobacco: Never Used   Substance and Sexual Activity    Alcohol use: No    Drug use: No    Sexual activity: No   Other Topics Concern    Not on file   Social History Narrative    12/14/17        Lives with 3 roommates.  Feeling safe at home. No pets       Review of Systems   Constitutional: Negative for malaise/fatigue. HENT: Negative for congestion. Eyes: Negative for blurred vision and pain. Respiratory: Negative for cough and shortness of breath. Cardiovascular: Positive for chest pain. Negative for palpitations. Gastrointestinal: Negative for abdominal pain and heartburn. Genitourinary: Negative for frequency and urgency. Musculoskeletal: Negative for joint pain and myalgias. Neurological: Positive for dizziness. Negative for tingling, sensory change, weakness and headaches. Psychiatric/Behavioral: Negative for depression, memory loss and substance abuse. Visit Vitals  /71 (BP 1 Location: Left arm, BP Patient Position: Sitting)   Pulse 99   Temp 98 °F (36.7 °C) (Oral)   Resp 18   Ht 5' 8\" (1.727 m)   Wt 186 lb 9.6 oz (84.6 kg)   LMP 11/27/2018   SpO2 99%   BMI 28.37 kg/m²       Physical Exam   Constitutional: She is oriented to person, place, and time. She appears well-developed and well-nourished. No distress. HENT:   Right Ear: External ear normal.   Left Ear: External ear normal.   Eyes: Conjunctivae and EOM are normal. Right eye exhibits no discharge. Left eye exhibits no discharge. Neck: Normal range of motion. Neck supple. Cardiovascular: Normal rate and regular rhythm. Pulmonary/Chest: Effort normal and breath sounds normal. She has no wheezes. Abdominal: Soft. Bowel sounds are normal. There is no tenderness. Lymphadenopathy:     She has no cervical adenopathy. Neurological: She is alert and oriented to person, place, and time. Skin: She is not diaphoretic. Psychiatric: She has a normal mood and affect. Her behavior is normal.   Nursing note and vitals reviewed. ASSESSMENT and PLAN    ICD-10-CM ICD-9-CM    1. Epigastric pain R10.13 789.06 omeprazole (PRILOSEC) 40 mg capsule sent to pharmacy. Prilosec prescribed.  She should take first think in the morning and wait 30 minutes before eating. Discussed that Prilosec takes 2 weeks to work. 2. Dizziness R42 780.4 Labs ordered. Compliant on iron tablets. 3. Mild intermittent asthma without complication Q75.80 043.15 She takes her inhalers as needed. 4. Anemia, unspecified type D64.9 285.9 CBC W/O DIFF      METABOLIC PANEL, COMPREHENSIVE      TSH 3RD GENERATION      OCCULT BLOOD IMMUNOASSAY,DIAGNOSTIC    CBC, CMP, TSH, and FIT ordered. Followed by hematology and compliant on iron tablets. This plan was reviewed with the patient and patient agrees. All questions were answered. This scribe documentation was reviewed by me and accurately reflects the examination and decisions made by me. This note will not be viewable in [address]

## 2018-12-20 LAB
ALBUMIN SERPL-MCNC: 4.1 G/DL (ref 3.5–5.5)
ALBUMIN/GLOB SERPL: 1.2 {RATIO} (ref 1.2–2.2)
ALP SERPL-CCNC: 39 IU/L (ref 39–117)
ALT SERPL-CCNC: 11 IU/L (ref 0–32)
AST SERPL-CCNC: 18 IU/L (ref 0–40)
BILIRUB SERPL-MCNC: 0.3 MG/DL (ref 0–1.2)
BUN SERPL-MCNC: 11 MG/DL (ref 6–20)
BUN/CREAT SERPL: 15 (ref 9–23)
CALCIUM SERPL-MCNC: 9.4 MG/DL (ref 8.7–10.2)
CHLORIDE SERPL-SCNC: 103 MMOL/L (ref 96–106)
CO2 SERPL-SCNC: 22 MMOL/L (ref 20–29)
CREAT SERPL-MCNC: 0.72 MG/DL (ref 0.57–1)
ERYTHROCYTE [DISTWIDTH] IN BLOOD BY AUTOMATED COUNT: 15.4 % (ref 12.3–15.4)
GLOBULIN SER CALC-MCNC: 3.4 G/DL (ref 1.5–4.5)
GLUCOSE SERPL-MCNC: 119 MG/DL (ref 65–99)
HCT VFR BLD AUTO: 39.9 % (ref 34–46.6)
HGB BLD-MCNC: 12.9 G/DL (ref 11.1–15.9)
MCH RBC QN AUTO: 26.4 PG (ref 26.6–33)
MCHC RBC AUTO-ENTMCNC: 32.3 G/DL (ref 31.5–35.7)
MCV RBC AUTO: 82 FL (ref 79–97)
PLATELET # BLD AUTO: 181 X10E3/UL (ref 150–379)
POTASSIUM SERPL-SCNC: 3.8 MMOL/L (ref 3.5–5.2)
PROT SERPL-MCNC: 7.5 G/DL (ref 6–8.5)
RBC # BLD AUTO: 4.88 X10E6/UL (ref 3.77–5.28)
SODIUM SERPL-SCNC: 139 MMOL/L (ref 134–144)
TSH SERPL DL<=0.005 MIU/L-ACNC: 0.97 UIU/ML (ref 0.45–4.5)
WBC # BLD AUTO: 6.6 X10E3/UL (ref 3.4–10.8)

## 2018-12-30 LAB — HEMOCCULT STL QL IA: NEGATIVE

## 2019-01-02 ENCOUNTER — OFFICE VISIT (OUTPATIENT)
Dept: PRIMARY CARE CLINIC | Age: 29
End: 2019-01-02

## 2019-01-02 VITALS
HEIGHT: 68 IN | RESPIRATION RATE: 18 BRPM | WEIGHT: 185.8 LBS | BODY MASS INDEX: 28.16 KG/M2 | TEMPERATURE: 98.4 F | DIASTOLIC BLOOD PRESSURE: 86 MMHG | HEART RATE: 95 BPM | OXYGEN SATURATION: 96 % | SYSTOLIC BLOOD PRESSURE: 119 MMHG

## 2019-01-02 DIAGNOSIS — J45.21 MILD INTERMITTENT ASTHMA WITH ACUTE EXACERBATION: ICD-10-CM

## 2019-01-02 DIAGNOSIS — R10.13 CHRONIC EPIGASTRIC PAIN: Primary | ICD-10-CM

## 2019-01-02 DIAGNOSIS — G89.29 CHRONIC EPIGASTRIC PAIN: Primary | ICD-10-CM

## 2019-01-02 NOTE — PROGRESS NOTES
Written by Brianna Adames, as dictated by Dr. Rome Cody MD. 
[address] [facility] Don Venegas is a 33 y.o. female. HPI The patient presents today for a follow-up. Patient was first seen on 12/19 with chest pain. She has been taking Prilosec 40 mg daily as prescribed since 12/20, but she is still experiencing epigastric pain. Patient notes that the medication only seemed to make her feel queasy. The patient notes that the chest pain is worst when she goes to bed and gets up in the morning, and patient notes that she has not been sleeping well. Patient described the pain as shooting and below her breasts. The pain sometimes radiates around to her back. Patient has been using her inhalers, which do not provide relief. She has had asthma episodes this winter. Labs were drawn on 12/19: she was not fasting and her glucose was 119. CBC, CMP, and TSH were normal. FIT was negative. Patient notes that her dizziness has improved since she has been home, eating better, and drinking more water. Patient Active Problem List  
Diagnosis Code  Chronic non-seasonal allergic rhinitis J30.89  
 Mild intermittent asthma with acute exacerbation J45.21  
 Overweight (BMI 25.0-29. 9) E66.3 Current Outpatient Medications on File Prior to Visit Medication Sig Dispense Refill  omeprazole (PRILOSEC) 40 mg capsule Take 1 Cap by mouth daily for 30 days. 30 Cap 0  
 Iron, Cbn & Gluc-FA-B12-C-DSS (FERRALET 90 DUAL-IRON DELIVERY) 90-1-12-50 mg-mg-mcg-mg tab Take 1 Each by mouth daily. 30 Tab 6  
 iron/folic LWWJ/Z53/F/NVRKIBIB (FERRALET 90 PO) Take  by mouth.  albuterol (PROVENTIL HFA, VENTOLIN HFA, PROAIR HFA) 90 mcg/actuation inhaler Take  by inhalation.  budesonide-formoterol (SYMBICORT) 80-4.5 mcg/actuation HFAA Take 2 Puffs by inhalation two (2) times a day.  1 Inhaler 1  
 nitrofurantoin, macrocrystal-monohydrate, (MACROBID) 100 mg capsule Take 1 Cap by mouth two (2) times a day. 14 Cap 0 No current facility-administered medications on file prior to visit. Allergies Allergen Reactions  Pcn [Penicillins] Anaphylaxis  Banana Rash Past Medical History:  
Diagnosis Date  Allergic rhinitis  Asthma Family History Problem Relation Age of Onset  Diabetes Mother   
     decsd at age 48 s/p cellulitis and amputation  Heart Failure Mother  Kidney Disease Mother  No Known Problems Father   
     not close  Diabetes Maternal Grandmother  Kidney Disease Maternal Grandmother Social History Socioeconomic History  Marital status: SINGLE Spouse name: Not on file  Number of children: 0  
 Years of education: Not on file  Highest education level: Not on file Social Needs  Financial resource strain: Not on file  Food insecurity - worry: Not on file  Food insecurity - inability: Not on file  Transportation needs - medical: Not on file  Transportation needs - non-medical: Not on file Occupational History  Occupation:  Tobacco Use  Smoking status: Never Smoker  Smokeless tobacco: Never Used Substance and Sexual Activity  Alcohol use: No  
 Drug use: No  
 Sexual activity: No  
Other Topics Concern  Not on file Social History Narrative 12/14/17 Lives with 3 roommates. Feeling safe at home. No pets Office Visit on 12/19/2018 Component Date Value Ref Range Status  WBC 12/19/2018 6.6  3.4 - 10.8 x10E3/uL Final  
 RBC 12/19/2018 4.88  3.77 - 5.28 x10E6/uL Final  
 HGB 12/19/2018 12.9  11.1 - 15.9 g/dL Final  
 HCT 12/19/2018 39.9  34.0 - 46.6 % Final  
 MCV 12/19/2018 82  79 - 97 fL Final  
 MCH 12/19/2018 26.4* 26.6 - 33.0 pg Final  
 MCHC 12/19/2018 32.3  31.5 - 35.7 g/dL Final  
 RDW 12/19/2018 15.4  12.3 - 15.4 % Final  
 PLATELET 43/87/8886 544  150 - 379 x10E3/uL Final  
  Glucose 12/19/2018 119* 65 - 99 mg/dL Final  
 BUN 12/19/2018 11  6 - 20 mg/dL Final  
 Creatinine 12/19/2018 0.72  0.57 - 1.00 mg/dL Final  
 GFR est non-AA 12/19/2018 115  >59 mL/min/1.73 Final  
 GFR est AA 12/19/2018 133  >59 mL/min/1.73 Final  
 BUN/Creatinine ratio 12/19/2018 15  9 - 23 Final  
 Sodium 12/19/2018 139  134 - 144 mmol/L Final  
 Potassium 12/19/2018 3.8  3.5 - 5.2 mmol/L Final  
 Chloride 12/19/2018 103  96 - 106 mmol/L Final  
 CO2 12/19/2018 22  20 - 29 mmol/L Final  
 Calcium 12/19/2018 9.4  8.7 - 10.2 mg/dL Final  
 Protein, total 12/19/2018 7.5  6.0 - 8.5 g/dL Final  
 Albumin 12/19/2018 4.1  3.5 - 5.5 g/dL Final  
 GLOBULIN, TOTAL 12/19/2018 3.4  1.5 - 4.5 g/dL Final  
 A-G Ratio 12/19/2018 1.2  1.2 - 2.2 Final  
 Bilirubin, total 12/19/2018 0.3  0.0 - 1.2 mg/dL Final  
 Alk. phosphatase 12/19/2018 39  39 - 117 IU/L Final  
 AST (SGOT) 12/19/2018 18  0 - 40 IU/L Final  
 ALT (SGPT) 12/19/2018 11  0 - 32 IU/L Final  
 TSH 12/19/2018 0.968  0.450 - 4.500 uIU/mL Final  
 Occult blood fecal, by IA 12/20/2018 Negative  Negative Final  
 
 
Review of Systems Respiratory: Negative for cough and shortness of breath. Cardiovascular: Positive for chest pain. Negative for palpitations. Gastrointestinal: Positive for abdominal pain. Negative for heartburn. Musculoskeletal: Negative for joint pain and myalgias. Neurological: Negative for dizziness, tingling, sensory change and headaches. Psychiatric/Behavioral: Negative for depression, memory loss and substance abuse. Visit Vitals /86 (BP 1 Location: Left arm, BP Patient Position: Sitting) Pulse 95 Temp 98.4 °F (36.9 °C) (Oral) Resp 18 Ht 5' 8\" (1.727 m) Wt 185 lb 12.8 oz (84.3 kg) LMP 01/01/2019 SpO2 96% BMI 28.25 kg/m² Physical Exam  
Constitutional: She is oriented to person, place, and time. She appears well-developed and well-nourished. No distress.   
HENT:  
 Right Ear: External ear normal.  
Left Ear: External ear normal.  
Eyes: Conjunctivae and EOM are normal. Right eye exhibits no discharge. Left eye exhibits no discharge. Neck: Normal range of motion. Neck supple. Cardiovascular: Normal rate and regular rhythm. Pulmonary/Chest: Effort normal and breath sounds normal. She has no wheezes. Abdominal: Soft. Bowel sounds are normal. There is no tenderness. Lymphadenopathy:  
  She has no cervical adenopathy. Neurological: She is alert and oriented to person, place, and time. Skin: She is not diaphoretic. Psychiatric: She has a normal mood and affect. Her behavior is normal.  
Nursing note and vitals reviewed. ASSESSMENT and PLAN 
  ICD-10-CM ICD-9-CM 1. Chronic epigastric pain R10.13 789.06 REFERRAL TO GASTROENTEROLOGY  
 G89.29 338.29 Referred to GI. Discussed that she will likely need an endoscopy. She should not continue taking Prilosec as it has not provided relief. 2. Mild intermittent asthma with acute exacerbation J45.21 493.92 REFERRAL TO GASTROENTEROLOGY Compliant on inhalers with relief. This plan was reviewed with the patient and patient agrees. All questions were answered. This scribe documentation was reviewed by me and accurately reflects the examination and decisions made by me. This note will not be viewable in [address]

## 2019-01-02 NOTE — PROGRESS NOTES
Visit Vitals /86 (BP 1 Location: Left arm, BP Patient Position: Sitting) Pulse 95 Temp 98.4 °F (36.9 °C) (Oral) Resp 18 Ht 5' 8\" (1.727 m) Wt 185 lb 12.8 oz (84.3 kg) SpO2 96% BMI 28.25 kg/m² Chief Complaint Patient presents with  Follow-up Chest Pains 1. Have you been to the ER, urgent care clinic since your last visit? Hospitalized since your last visit? Denies 2. Have you seen or consulted any other health care providers outside of the [address] [place] [address] since your last visit? Include any pap smears or colon screening. Denies

## 2019-01-03 LAB
HBA1C MFR BLD: 5.6 % (ref 4.8–5.6)
SPECIMEN STATUS REPORT, ROLRST: NORMAL

## 2019-01-09 ENCOUNTER — OFFICE VISIT (OUTPATIENT)
Dept: PRIMARY CARE CLINIC | Age: 29
End: 2019-01-09

## 2019-01-09 ENCOUNTER — HOSPITAL ENCOUNTER (OUTPATIENT)
Dept: GENERAL RADIOLOGY | Age: 29
Discharge: HOME | End: 2019-01-09
Payer: COMMERCIAL

## 2019-01-09 VITALS
TEMPERATURE: 97.8 F | DIASTOLIC BLOOD PRESSURE: 69 MMHG | SYSTOLIC BLOOD PRESSURE: 125 MMHG | BODY MASS INDEX: 27.89 KG/M2 | OXYGEN SATURATION: 100 % | WEIGHT: 184 LBS | HEART RATE: 75 BPM | RESPIRATION RATE: 16 BRPM | HEIGHT: 68 IN

## 2019-01-09 DIAGNOSIS — R35.0 URINARY FREQUENCY: ICD-10-CM

## 2019-01-09 DIAGNOSIS — R07.1 PAINFUL BREATHING: Primary | ICD-10-CM

## 2019-01-09 DIAGNOSIS — R07.1 PAINFUL BREATHING: ICD-10-CM

## 2019-01-09 LAB
BILIRUB UR QL STRIP: NEGATIVE
GLUCOSE UR-MCNC: NEGATIVE MG/DL
KETONES P FAST UR STRIP-MCNC: NEGATIVE MG/DL
PH UR STRIP: 8 [PH] (ref 4.6–8)
PROT UR QL STRIP: NEGATIVE
SP GR UR STRIP: 1.02 (ref 1–1.03)
UA UROBILINOGEN AMB POC: NORMAL (ref 0.2–1)
URINALYSIS CLARITY POC: NORMAL
URINALYSIS COLOR POC: YELLOW
URINE BLOOD POC: NEGATIVE
URINE LEUKOCYTES POC: NEGATIVE
URINE NITRITES POC: NEGATIVE

## 2019-01-09 PROCEDURE — 71046 X-RAY EXAM CHEST 2 VIEWS: CPT

## 2019-01-09 NOTE — PROGRESS NOTES
Chief Complaint   Patient presents with   [facility] Other     states that she has a pulling and now she is getting a sharp pain when she tries to take a deep breath.  states that this has been going on for quite a while     States that she was seen by doctor dajuan for the same and still wants to see Dr Joce Llamas just could not get on her schedule today.

## 2019-01-09 NOTE — LETTER
NOTIFICATION RETURN TO WORK / SCHOOL 
 
1/9/2019 8:44 AM 
 
Ms. Carlton Oh [facility] [address] [id number] To Whom It May Concern: Carlton Oh is currently under the care of Marta Saldana. She will return to work/school on: 1/9/2019. If there are questions or concerns please have the patient contact our office.  
 
 
 
Sincerely, 
 
 
Chris Torres NP

## 2019-01-09 NOTE — PROGRESS NOTES
This note will not be viewable in [address] Jerica Raymundo is a  33 y.o. female presents for visit. Chief Complaint   Patient presents with    Rib Pain     states that she has a pulling and now she is getting a sharp pain when she tries to take a deep breath.  states that this has been going on for quite a while     HPI  Patient presents with c/o moderately sharp rib pain bilaterally with deep inspiration for past 5 days. She was evaluated by Dr. Aftab Bergman on 1/2/19 for a \"pulling\" chest/abdominal sensation and referred to GI. Appointment scheduled on 1/17/19. Denies recent trauma, fever/chills. Interested in Chest X-ray today. Also reports diarrhea this morning and urinary frequency. Review of Systems   Constitutional: Negative for chills and fever. HENT: Negative for congestion. Respiratory: Negative for cough, hemoptysis, sputum production and shortness of breath. Chest tightness -baseline with asthma   Cardiovascular: Negative for chest pain. Gastrointestinal: Positive for diarrhea. Negative for abdominal pain, constipation, heartburn, nausea and vomiting. Genitourinary: Positive for flank pain and urgency. Negative for dysuria and hematuria. Visit Vitals  /69 (BP 1 Location: Left arm, BP Patient Position: Sitting)   Pulse 75   Temp 97.8 °F (36.6 °C) (Oral)   Resp 16   Ht 5' 8\" (1.727 m)   Wt 184 lb (83.5 kg)   LMP 01/01/2019   SpO2 100%   BMI 27.98 kg/m²     Physical Exam   Constitutional: She is oriented to person, place, and time. She appears well-developed and well-nourished. No distress. HENT:   Head: Normocephalic and atraumatic. Right Ear: External ear normal.   Left Ear: External ear normal.   Eyes: Conjunctivae are normal.   Cardiovascular: Normal rate, regular rhythm and normal heart sounds. Pulmonary/Chest: Effort normal and breath sounds normal. No accessory muscle usage. No respiratory distress. She has no decreased breath sounds.  She has no wheezes. She has no rhonchi. She exhibits no tenderness. Abdominal: Soft. Normal appearance and bowel sounds are normal. There is CVA tenderness. Musculoskeletal: She exhibits no edema. Neurological: She is alert and oriented to person, place, and time. Skin: Skin is warm and dry. Psychiatric: She has a normal mood and affect. Her behavior is normal.   Nursing note and vitals reviewed. Recent Results (from the past 24 hour(s))   AMB POC URINALYSIS DIP STICK AUTO W/O MICRO    Collection Time: 01/09/19  7:36 AM   Result Value Ref Range    Color (UA POC) Yellow     Clarity (UA POC) Slightly Cloudy     Glucose (UA POC) Negative Negative    Bilirubin (UA POC) Negative Negative    Ketones (UA POC) Negative Negative    Specific gravity (UA POC) 1.025 1.001 - 1.035    Blood (UA POC) Negative Negative    pH (UA POC) 8.0 4.6 - 8.0    Protein (UA POC) Negative Negative    Urobilinogen (UA POC) 0.2 mg/dL 0.2 - 1    Nitrites (UA POC) Negative Negative    Leukocyte esterase (UA POC) Negative Negative       Patient Active Problem List    Diagnosis Date Noted    Chronic non-seasonal allergic rhinitis 12/14/2017    Mild intermittent asthma with acute exacerbation 12/14/2017    Overweight (BMI 25.0-29.9) 12/14/2017         ASSESSMENT AND PLAN:      ICD-10-CM ICD-9-CM   1. Painful breathing R07.1 786.52   2. Urinary frequency R35.0 788.41     Orders Placed This Encounter    XR CHEST PA LAT     Standing Status:   Future     Standing Expiration Date:   2/8/2020     Order Specific Question:   Reason for Exam     Answer:   moderate to severe chest pain on inspiration     Order Specific Question:   Is Patient Allergic to Contrast Dye? Answer:   No     Order Specific Question:   Is Patient Pregnant? Answer:   No    AMB POC URINALYSIS DIP STICK AUTO W/O MICRO     Diagnoses and all orders for this visit:    1. Painful breathing  -     XR CHEST PA LAT; Future    2.  Urinary frequency  -     AMB POC URINALYSIS DIP STICK AUTO W/O MICRO- UA normal        lab results and schedule of future lab studies reviewed with patient  radiology results and schedule of future radiology studies reviewed with patient  Follow up with GI and continue current treatment pending test results. Follow-up Disposition:  Return in about 4 weeks (around 2/6/2019), or if symptoms worsen or fail to improve, for chronic care. Disclaimer:  Advised her to call back or return to office if symptoms worsen/change/persist.  Discussed expected course/resolution/complications of diagnosis in detail with patient. Medication risks/benefits/alternatives discussed with patient. She was given an after visit summary which includes diagnoses, current medications, & vitals. Discussed patient instructions and advised to read to all patient instructions regarding care. She expressed understanding with the diagnosis and plan.

## 2019-01-11 NOTE — PROGRESS NOTES
Called and spoke with patient after confirming patient identifiers and advised her chest x-ray results are normal. Patient expressed thanks for the phone call. No questions or concerns voiced. End of encounter.

## 2019-01-18 ENCOUNTER — TELEPHONE (OUTPATIENT)
Dept: PRIMARY CARE CLINIC | Age: 29
End: 2019-01-18

## 2019-01-18 NOTE — TELEPHONE ENCOUNTER
Patient returns phone call and states that Dr. Stephanie Adamson office was booked and that she was referred to Dr. Migel Duke of [facility]. Requesting her most recent office notes and labs be faxed to the [facility]. Advised we would do so. No other questions or concerns voiced. Encouraged to call the office as needed. End of encounter.

## 2019-01-18 NOTE — TELEPHONE ENCOUNTER
Pt stated she needs records sent over to Dr. Demarco Dunne gastroenterology office.  Please follow up bcn: [phone]

## 2019-02-28 ENCOUNTER — HOSPITAL ENCOUNTER (EMERGENCY)
Age: 29
Discharge: HOME | End: 2019-02-28
Attending: EMERGENCY MEDICINE
Payer: COMMERCIAL

## 2019-02-28 VITALS — HEART RATE: 102 BPM | OXYGEN SATURATION: 98 %

## 2019-02-28 PROCEDURE — 75810000275 HC EMERGENCY DEPT VISIT NO LEVEL OF CARE

## 2019-02-28 NOTE — ED TRIAGE NOTES
Pt in waiting room laughing and talking on telephone, pt c/o chest pain and sob that started pta and has now resolved

## 2019-04-19 ENCOUNTER — OFFICE VISIT (OUTPATIENT)
Dept: PRIMARY CARE CLINIC | Age: 29
End: 2019-04-19

## 2019-04-19 VITALS
TEMPERATURE: 97.9 F | OXYGEN SATURATION: 100 % | DIASTOLIC BLOOD PRESSURE: 81 MMHG | RESPIRATION RATE: 18 BRPM | HEIGHT: 68 IN | BODY MASS INDEX: 28.46 KG/M2 | WEIGHT: 187.8 LBS | SYSTOLIC BLOOD PRESSURE: 122 MMHG | HEART RATE: 82 BPM

## 2019-04-19 DIAGNOSIS — J45.21 MILD INTERMITTENT ASTHMA WITH ACUTE EXACERBATION: ICD-10-CM

## 2019-04-19 DIAGNOSIS — R07.89 COSTOCHONDRAL CHEST PAIN: Primary | ICD-10-CM

## 2019-04-19 DIAGNOSIS — R42 DIZZINESS: ICD-10-CM

## 2019-04-19 DIAGNOSIS — R10.13 EPIGASTRIC PAIN: ICD-10-CM

## 2019-04-19 DIAGNOSIS — J45.909 ASTHMA DUE TO SEASONAL ALLERGIES: ICD-10-CM

## 2019-04-19 RX ORDER — OMEPRAZOLE 40 MG/1
40 CAPSULE, DELAYED RELEASE ORAL DAILY
Qty: 30 CAP | Refills: 0 | Status: SHIPPED | OUTPATIENT
Start: 2019-04-19 | End: 2019-05-19

## 2019-04-19 RX ORDER — PREDNISONE 20 MG/1
TABLET ORAL
Qty: 12 TAB | Refills: 0 | Status: SHIPPED | OUTPATIENT
Start: 2019-04-19 | End: 2019-08-30 | Stop reason: ALTCHOICE

## 2019-04-19 RX ORDER — MONTELUKAST SODIUM 10 MG/1
TABLET ORAL
COMMUNITY
Start: 2019-03-13 | End: 2020-11-02 | Stop reason: SDUPTHER

## 2019-04-19 NOTE — PROGRESS NOTES
Written by Tequila Sinclair, as dictated by Dr. Carlyn Page MD. 
 
[address] [facility] April Raya is a 30 y.o. female. HPI The patient presents today c/o persisting chest pain x 2 years. Her chest pain seems to have worsened and radiates down her back. Pt notes that she feels pain when she takes deep breaths or moves while trying to sleep. The pt took Prilosec for 2 weeks but did not experience relief. She was seen by GI and had an endoscopy which was normal. Pt was then referred to cardiology, who referred her back to allergy/immunology. She takes OTC ibuprofen for menstrual cramps, but not for her current sxs. The patient has not tried taking prednisone for this pain. 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 Patient saw an asthma specialist and had allergy testing. She reports that she was allergic to everything except mold. The patient was prescribed Singulair 10 mg and Qvar and notes that she has not had an asthma attack since then. She has not been taking OTC Allegra or Zyrtec. Patient Active Problem List  
Diagnosis Code  Chronic non-seasonal allergic rhinitis J30.89  
 Mild intermittent asthma with acute exacerbation J45.21  
 Overweight (BMI 25.0-29. 9) E66.3 Current Outpatient Medications on File Prior to Visit Medication Sig Dispense Refill  montelukast (SINGULAIR) 10 mg tablet  beclomethasone (QVAR) 80 mcg/actuation aero Take 1 Puff by inhalation two (2) times a day.  Iron, Cbn & Gluc-FA-B12-C-DSS (FERRALET 90 DUAL-IRON DELIVERY) 90-1-12-50 mg-mg-mcg-mg tab Take 1 Each by mouth daily. 30 Tab 6  
 iron/folic SFSE/R10/I/RXEBSYDW (FERRALET 90 PO) Take  by mouth.  albuterol (PROVENTIL HFA, VENTOLIN HFA, PROAIR HFA) 90 mcg/actuation inhaler Take  by inhalation.  budesonide-formoterol (SYMBICORT) 80-4.5 mcg/actuation HFAA Take 2 Puffs by inhalation two (2) times a day.  1 Inhaler 1  
 
 No current facility-administered medications on file prior to visit. Allergies Allergen Reactions  Pcn [Penicillins] Anaphylaxis  Banana Rash Past Medical History:  
Diagnosis Date  Allergic rhinitis  Asthma Family History Problem Relation Age of Onset  Diabetes Mother   
     decsd at age 48 s/p cellulitis and amputation  Heart Failure Mother  Kidney Disease Mother  No Known Problems Father   
     not close  Diabetes Maternal Grandmother  Kidney Disease Maternal Grandmother Social History Socioeconomic History  Marital status: SINGLE Spouse name: Not on file  Number of children: 0  
 Years of education: Not on file  Highest education level: Not on file Occupational History  Occupation:  Social Needs  Financial resource strain: Not on file  Food insecurity:  
  Worry: Not on file Inability: Not on file  Transportation needs:  
  Medical: Not on file Non-medical: Not on file Tobacco Use  Smoking status: Never Smoker  Smokeless tobacco: Never Used Substance and Sexual Activity  Alcohol use: No  
 Drug use: No  
 Sexual activity: Never Lifestyle  Physical activity:  
  Days per week: Not on file Minutes per session: Not on file  Stress: Not on file Relationships  Social connections:  
  Talks on phone: Not on file Gets together: Not on file Attends [employer] service: Not on file Active member of club or organization: Not on file Attends meetings of clubs or organizations: Not on file Relationship status: Not on file  Intimate partner violence:  
  Fear of current or ex partner: Not on file Emotionally abused: Not on file Physically abused: Not on file Forced sexual activity: Not on file Other Topics Concern  Not on file Social History Narrative 12/14/17 Lives with 3 roommates. Feeling safe at home. No pets Review of Systems Constitutional: Negative for malaise/fatigue. HENT: Negative for congestion. Eyes: Negative for blurred vision and pain. Respiratory: Negative for cough and shortness of breath. Cardiovascular: Positive for chest pain (radiates to back). Negative for palpitations. Gastrointestinal: Positive for heartburn. Negative for abdominal pain. Genitourinary: Negative for frequency and urgency. Musculoskeletal: Positive for back pain. Negative for joint pain and myalgias. Neurological: Positive for dizziness. Negative for tingling, sensory change, weakness and headaches. Endo/Heme/Allergies: Positive for environmental allergies. Psychiatric/Behavioral: Negative for depression, memory loss and substance abuse. Visit Vitals /81 (BP 1 Location: Right arm, BP Patient Position: Sitting) Pulse 82 Temp 97.9 °F (36.6 °C) (Oral) Resp 18 Ht 5' 8\" (1.727 m) Wt 187 lb 12.8 oz (85.2 kg) LMP 04/08/2019 SpO2 100% BMI 28.55 kg/m² Physical Exam  
Constitutional: She is oriented to person, place, and time. She appears well-developed and well-nourished. No distress. HENT:  
Right Ear: External ear normal.  
Left Ear: External ear normal.  
Eyes: Conjunctivae and EOM are normal. Right eye exhibits no discharge. Left eye exhibits no discharge. Neck: Normal range of motion. Neck supple. Cardiovascular: Normal rate and regular rhythm. Pulmonary/Chest: Effort normal and breath sounds normal. She has no wheezes. R side rib tenderness on exam  
Abdominal: Soft. Bowel sounds are normal. There is no tenderness. Lymphadenopathy:  
  She has no cervical adenopathy. Neurological: She is alert and oriented to person, place, and time. Skin: She is not diaphoretic. Psychiatric: She has a normal mood and affect. Her behavior is normal.  
Nursing note and vitals reviewed. ASSESSMENT and PLAN 
  ICD-10-CM ICD-9-CM 1. Costochondral chest pain R07.1 786.52 predniSONE (DELTASONE) 20 mg tablet sent to pharmacy. Prednisone 20 mg prescribed. Potential side effects were discussed. I want the patient to take the medication po as follows: 3 pills x 2 days, 2 pills x 2 days, 1 pill x 1 day and 1/2 pill x 1 day. The patient was advised to take this medication with food. If no improvement after finishing the medication RTC. 2. Mild intermittent asthma with acute exacerbation J45.21 493.92 Followed by allergy and doing well on Qvar. 3. Asthma due to seasonal allergies J45.909 493.90 She should continue Singulair 10 mg and start taking OTC Allegra or Zyrtec daily. 4. Epigastric pain R10.13 789.06 omeprazole (PRILOSEC) 40 mg capsule sent to pharmacy. Prilosec 40 mg prescribed. Potential side effects were discussed. She should take first thing in the morning 30 minutes before eating. 5. Dizziness R42 780.4 She should continue Singulair 10 mg and start taking OTC Allegra or Zyrtec daily. This plan was reviewed with the patient and patient agrees. All questions were answered. This scribe documentation was reviewed by me and accurately reflects the examination and decisions made by me. This note will not be viewable in [address]

## 2019-04-19 NOTE — PROGRESS NOTES
Identified pt with two pt identifiers(name and ). Reviewed record in preparation for visit and have obtained necessary documentation. All patient medications has been reviewed. Chief Complaint Patient presents with  Chest Pain  
  radiates to back  Dizziness  Medication Refill  
  pt is requesting a stronger allergy med Health Maintenance Due Topic  Pneumococcal 0-64 years (1 of 1 - PPSV23)  DTaP/Tdap/Td series (1 - Tdap)  PAP AKA CERVICAL CYTOLOGY Vitals:  
 19 1553 19 1557 BP: 139/75 122/81 Pulse: 82 Resp: 18 Temp: 97.9 °F (36.6 °C) TempSrc: Oral   
SpO2: 100% Weight: 187 lb 12.8 oz (85.2 kg) Height: 5' 8\" (1.727 m) PainSc:   8 PainLoc: Chest   
LMP: 2019 Coordination of Care Questionnaire: 
:  
1) Have you been to an emergency room, urgent care, or hospitalized since your last visit?   no    
 
2. Have seen or consulted any other health care provider since your last visit? NO 
 
3) Do you have an Advanced Directive/ Living Will in place? NO If yes, do we have a copy on file NO If no, would you like information NO Patient is accompanied by self I have received verbal consent from Brittany Santos to discuss any/all medical information while they are present in the room.

## 2019-08-30 ENCOUNTER — OFFICE VISIT (OUTPATIENT)
Dept: PRIMARY CARE CLINIC | Age: 29
End: 2019-08-30

## 2019-08-30 VITALS
HEART RATE: 112 BPM | SYSTOLIC BLOOD PRESSURE: 131 MMHG | RESPIRATION RATE: 16 BRPM | WEIGHT: 186.7 LBS | HEIGHT: 68 IN | OXYGEN SATURATION: 100 % | DIASTOLIC BLOOD PRESSURE: 89 MMHG | TEMPERATURE: 98.6 F | BODY MASS INDEX: 28.3 KG/M2

## 2019-08-30 DIAGNOSIS — S13.4XXD WHIPLASH INJURY TO NECK, SUBSEQUENT ENCOUNTER: ICD-10-CM

## 2019-08-30 DIAGNOSIS — G44.311 INTRACTABLE ACUTE POST-TRAUMATIC HEADACHE: Primary | ICD-10-CM

## 2019-08-30 DIAGNOSIS — M25.511 ACUTE PAIN OF RIGHT SHOULDER: ICD-10-CM

## 2019-08-30 RX ORDER — CYCLOBENZAPRINE HCL 10 MG
10 TABLET ORAL
Qty: 15 TAB | Refills: 0 | Status: SHIPPED | OUTPATIENT
Start: 2019-08-30 | End: 2019-09-14

## 2019-08-30 RX ORDER — PREDNISONE 20 MG/1
TABLET ORAL
Qty: 18 TAB | Refills: 0 | Status: SHIPPED | OUTPATIENT
Start: 2019-08-30 | End: 2019-09-27 | Stop reason: ALTCHOICE
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
## 2019-09-06 ENCOUNTER — OFFICE VISIT (OUTPATIENT)
Dept: PRIMARY CARE CLINIC | Age: 29
End: 2019-09-06

## 2019-09-06 ENCOUNTER — TELEPHONE (OUTPATIENT)
Dept: PRIMARY CARE CLINIC | Age: 29
End: 2019-09-06

## 2019-09-06 VITALS
BODY MASS INDEX: 28.67 KG/M2 | OXYGEN SATURATION: 100 % | HEIGHT: 68 IN | HEART RATE: 99 BPM | WEIGHT: 189.2 LBS | RESPIRATION RATE: 16 BRPM | TEMPERATURE: 98.2 F | DIASTOLIC BLOOD PRESSURE: 87 MMHG | SYSTOLIC BLOOD PRESSURE: 136 MMHG

## 2019-09-06 DIAGNOSIS — M25.511 ACUTE PAIN OF RIGHT SHOULDER DUE TO TRAUMA: Primary | ICD-10-CM

## 2019-09-06 DIAGNOSIS — E04.1 THYROID NODULE: ICD-10-CM

## 2019-09-06 DIAGNOSIS — R51.9 FREQUENT HEADACHES: ICD-10-CM

## 2019-09-06 DIAGNOSIS — G89.11 ACUTE PAIN OF RIGHT SHOULDER DUE TO TRAUMA: Primary | ICD-10-CM
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
## 2019-09-06 DIAGNOSIS — M43.6 NECK STIFFNESS: ICD-10-CM

## 2019-09-06 RX ORDER — DIFLUNISAL 500 MG/1
500 TABLET, FILM COATED ORAL 2 TIMES DAILY
Qty: 30 TAB | Refills: 0 | Status: SHIPPED | OUTPATIENT
Start: 2019-09-06 | End: 2019-09-21

## 2019-09-06 NOTE — TELEPHONE ENCOUNTER
Confirmed patient id and she states that work needs to know if she is to be out of work until she is re-evalulated on the 27th by Dr Tao Bernabe. If so she needs a letter in her chart and she can access it and it needs to say out until the eval on the 27th. Or it needs to say she can return to work now.
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
## 2019-09-06 NOTE — TELEPHONE ENCOUNTER
Patient id confirmed and she is unable to get in with the entire office until the 13 th of November. Advised to call number on the back of her insurance and find out who is in her network and to call some of those offices. She will and will call if another referral is needed.
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
## 2019-09-10 ENCOUNTER — PATIENT MESSAGE (OUTPATIENT)
Dept: PRIMARY CARE CLINIC | Age: 29
End: 2019-09-10

## 2019-09-11 NOTE — TELEPHONE ENCOUNTER
Patient wants to access through my chart and will take it or send to her employer. Sent my chart message to send us the fax number if she needs us to fax the letter for her.

## 2019-09-16 ENCOUNTER — TELEPHONE (OUTPATIENT)
Dept: PRIMARY CARE CLINIC | Age: 29
End: 2019-09-16

## 2019-09-16 NOTE — TELEPHONE ENCOUNTER
Patient was notified via My Chart that documentation had been sent to [facility] last week. Forwarded to Dr. Lexi Ruby in regard to letter to employer. End of encounter.

## 2019-09-16 NOTE — TELEPHONE ENCOUNTER
----- Message from Lupe Bauer sent at 9/10/2019  9:05 AM EDT -----  Regarding: /Telephone  Pt advised she was checking to see if her records had been sent to her Neurology provider. She also advised she wanted to see if the note for her job was prepared.  Pt's best contact [phone]

## 2019-09-27 ENCOUNTER — OFFICE VISIT (OUTPATIENT)
Dept: PRIMARY CARE CLINIC | Age: 29
End: 2019-09-27

## 2019-09-27 VITALS
SYSTOLIC BLOOD PRESSURE: 133 MMHG | HEART RATE: 95 BPM | DIASTOLIC BLOOD PRESSURE: 86 MMHG | BODY MASS INDEX: 29.34 KG/M2 | OXYGEN SATURATION: 100 % | RESPIRATION RATE: 16 BRPM | HEIGHT: 68 IN | TEMPERATURE: 98 F | WEIGHT: 193.6 LBS

## 2019-09-27 DIAGNOSIS — G89.29 CHRONIC RIGHT SHOULDER PAIN: ICD-10-CM

## 2019-09-27 DIAGNOSIS — M54.2 NECK PAIN: ICD-10-CM

## 2019-09-27 DIAGNOSIS — M25.511 CHRONIC RIGHT SHOULDER PAIN: ICD-10-CM

## 2019-09-27 DIAGNOSIS — G44.309 POST-CONCUSSION HEADACHE: Primary | ICD-10-CM

## 2019-09-27 RX ORDER — CYCLOBENZAPRINE HCL 10 MG
10 TABLET ORAL
Qty: 30 TAB | Refills: 0 | Status: SHIPPED | OUTPATIENT
Start: 2019-09-27 | End: 2019-10-27

## 2019-09-27 NOTE — PROGRESS NOTES
Written by Primo Sun, as dictated by Dr. Lion Crow MD.    Laron Whittington is a 30 y.o. female. HPI  The patient presents today for follow-up. Patient is accompanied by her godmother. She is trying to get better, and is progressing slowly. Since her last visit, she has seen Dr. Digna Gomes (neurologist), who prescribed her Fioricet and sent her to concussion recovery physical therapy. They have been working with her to help manage her headaches, and will be working with her 3 times a week for the first week, and then twice a week. She will be reevaluated in 2 weeks about her progress and her ability to go back to work. She still has intense headaches, but they have improved with the medication and therapy. Dr. Digna Gomes also didn't like the limited ROM in her neck, and as referred to Orthopedics, who ordered an X-ray, which showed that the curvature of her neck was gone, and may be the reason for her headaches. They are sending her to get an MRI, and to follow with Dr. Keya Akbar (pain management). She still has pain in her neck, which is slowly spreading to her back. She notes the pain worsens when she moves too quickly. Otherwise, she has been doing better, and her shoulder has been ok. Patient Active Problem List   Diagnosis Code    Chronic non-seasonal allergic rhinitis J30.89    Mild intermittent asthma with acute exacerbation J45.21    Overweight (BMI 25.0-29. 9) E66.3    Thyroid nodule E04.1        Current Outpatient Medications on File Prior to Visit   Medication Sig Dispense Refill    montelukast (SINGULAIR) 10 mg tablet       beclomethasone (QVAR) 80 mcg/actuation aero Take 1 Puff by inhalation two (2) times a day.  Iron, Cbn & Gluc-FA-B12-C-DSS (FERRALET 90 DUAL-IRON DELIVERY) 90-1-12-50 mg-mg-mcg-mg tab Take 1 Each by mouth daily. 30 Tab 6    iron/folic FCUI/M90/O/MUIMQCEO (FERRALET 90 PO) Take  by mouth.       albuterol (PROVENTIL HFA, VENTOLIN HFA, PROAIR HFA) 90 mcg/actuation inhaler Take  by inhalation.  budesonide-formoterol (SYMBICORT) 80-4.5 mcg/actuation HFAA Take 2 Puffs by inhalation two (2) times a day. 1 Inhaler 1     No current facility-administered medications on file prior to visit. Allergies   Allergen Reactions    Pcn [Penicillins] Anaphylaxis    Banana Rash       Past Medical History:   Diagnosis Date    Allergic rhinitis     Asthma        No past surgical history on file.     Family History   Problem Relation Age of Onset    Diabetes Mother         decsd at age 48 s/p cellulitis and amputation    Heart Failure Mother     Kidney Disease Mother     No Known Problems Father         not close    Diabetes Maternal Grandmother     Kidney Disease Maternal Grandmother        Social History     Socioeconomic History    Marital status: SINGLE     Spouse name: Not on file    Number of children: 0    Years of education: Not on file    Highest education level: Not on file   Occupational History    Occupation:    Social Needs    Financial resource strain: Not on file    Food insecurity:     Worry: Not on file     Inability: Not on file    Transportation needs:     Medical: Not on file     Non-medical: Not on file   Tobacco Use    Smoking status: Never Smoker    Smokeless tobacco: Never Used   Substance and Sexual Activity    Alcohol use: No    Drug use: No    Sexual activity: Never   Lifestyle    Physical activity:     Days per week: Not on file     Minutes per session: Not on file    Stress: Not on file   Relationships    Social connections:     Talks on phone: Not on file     Gets together: Not on file     Attends Tenriism service: Not on file     Active member of club or organization: Not on file     Attends meetings of clubs or organizations: Not on file     Relationship status: Not on file    Intimate partner violence:     Fear of current or ex partner: Not on file     Emotionally abused: Not on file     Physically abused: Not on file     Forced sexual activity: Not on file   Other Topics Concern    Not on file   Social History Narrative    12/14/17        Lives with 3 roommates. Feeling safe at home. No pets       Review of Systems   Respiratory: Negative for shortness of breath. Musculoskeletal: Positive for joint pain (R shoulder) and neck pain. Negative for myalgias. Neurological: Positive for headaches (improving). Negative for dizziness. Psychiatric/Behavioral: Negative for depression and substance abuse. The patient is not nervous/anxious and does not have insomnia. Visit Vitals  /86 (BP 1 Location: Right arm, BP Patient Position: Sitting)   Pulse 95   Temp 98 °F (36.7 °C) (Oral)   Resp 16   Ht 5' 8\" (1.727 m)   Wt 193 lb 9.6 oz (87.8 kg)   LMP 09/16/2019   SpO2 100%   BMI 29.44 kg/m²       Physical Exam   Constitutional: She is oriented to person, place, and time. She appears well-developed and well-nourished. No distress. HENT:   Head: Normocephalic and atraumatic. Right Ear: External ear normal.   Left Ear: External ear normal.   Eyes: Pupils are equal, round, and reactive to light. Conjunctivae and EOM are normal. Right eye exhibits no discharge. Left eye exhibits no discharge. Neck: Neck supple. Muscular tenderness present. Decreased range of motion present. Cardiovascular: Normal rate, regular rhythm and normal heart sounds. Exam reveals no gallop and no friction rub. No murmur heard. Pulmonary/Chest: Effort normal and breath sounds normal. She has no wheezes. Abdominal: Soft. Bowel sounds are normal. There is no tenderness. Neurological: She is alert and oriented to person, place, and time. She has normal reflexes. Skin: She is not diaphoretic. Psychiatric: She has a normal mood and affect. Her behavior is normal. Thought content normal.   Nursing note and vitals reviewed.       ASSESSMENT and PLAN    ICD-10-CM ICD-9-CM    1. Post-concussion headache N54.804 339.20 Followed by Neurology. 2. Chronic right shoulder pain M25.511 719.41 cyclobenzaprine (FLEXERIL) 10 mg tablet sent to pharmacy. G89.29 338.29   Flexeril 10 mg refilled. 3. Neck pain M54.2 723.1 cyclobenzaprine (FLEXERIL) 10 mg tablet sent to pharmacy. Flexeril 10 mg refilled. Note given for work. This plan was reviewed with the patient and patient agrees. All questions were answered. This scribe documentation was reviewed by me and accurately reflects the examination and decisions made by me. This note will not be viewable in [address]

## 2019-09-27 NOTE — LETTER
NOTIFICATION RETURN TO WORK / SCHOOL 
 
9/27/2019 1:38 PM 
 
Ms. Sade Lakhani [facility] [address] [id number] To Whom It May Concern: Sade Lakhani is currently under the care of Marta Saldana. Patient was seen in office for post-concussion reassessment. She is going to concussion therapy, who would like to start treatment 3 times a week, and will be reevaluating in about 2 weeks whether or not she will be able to go back to work. If there are questions or concerns please have the patient contact our office. Sincerely, Veda Ferrari MD
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
## 2019-10-08 ENCOUNTER — PATIENT MESSAGE (OUTPATIENT)
Dept: PRIMARY CARE CLINIC | Age: 29
End: 2019-10-08

## 2019-10-08 RX ORDER — BUDESONIDE AND FORMOTEROL FUMARATE DIHYDRATE 80; 4.5 UG/1; UG/1
2 AEROSOL RESPIRATORY (INHALATION) 2 TIMES DAILY
Qty: 1 INHALER | Refills: 1 | Status: SHIPPED | OUTPATIENT
Start: 2019-10-08 | End: 2020-06-23 | Stop reason: SDUPTHER

## 2020-03-09 ENCOUNTER — OFFICE VISIT (OUTPATIENT)
Dept: PRIMARY CARE CLINIC | Age: 30
End: 2020-03-09

## 2020-03-09 VITALS
HEIGHT: 68 IN | TEMPERATURE: 98.1 F | OXYGEN SATURATION: 100 % | RESPIRATION RATE: 16 BRPM | DIASTOLIC BLOOD PRESSURE: 76 MMHG | WEIGHT: 189.8 LBS | HEART RATE: 95 BPM | BODY MASS INDEX: 28.76 KG/M2 | SYSTOLIC BLOOD PRESSURE: 134 MMHG

## 2020-03-09 DIAGNOSIS — R42 LIGHTHEADED: ICD-10-CM

## 2020-03-09 DIAGNOSIS — R20.0 NUMBNESS AND TINGLING: Primary | ICD-10-CM

## 2020-03-09 DIAGNOSIS — Z01.89 ROUTINE LAB DRAW: ICD-10-CM

## 2020-03-09 DIAGNOSIS — R20.2 NUMBNESS AND TINGLING: Primary | ICD-10-CM

## 2020-03-09 RX ORDER — CYCLOBENZAPRINE HCL 5 MG
5 TABLET ORAL
COMMUNITY
End: 2021-09-03 | Stop reason: ALTCHOICE

## 2020-03-09 NOTE — PROGRESS NOTES
Chief Complaint   Patient presents with    Headache     states that her headaches stims from concussion and states that is taken care of but is still having numbness and dizziness and is afraid of diabetes. has not had any labs done in a while she is not fasting.
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
## 2020-03-10 ENCOUNTER — PATIENT MESSAGE (OUTPATIENT)
Dept: PRIMARY CARE CLINIC | Age: 30
End: 2020-03-10

## 2020-03-10 LAB
ALBUMIN SERPL-MCNC: 4.4 G/DL (ref 3.9–5)
ALBUMIN/GLOB SERPL: 1.5 {RATIO} (ref 1.2–2.2)
ALP SERPL-CCNC: 33 IU/L (ref 39–117)
ALT SERPL-CCNC: 8 IU/L (ref 0–32)
AST SERPL-CCNC: 15 IU/L (ref 0–40)
BILIRUB SERPL-MCNC: 0.4 MG/DL (ref 0–1.2)
BUN SERPL-MCNC: 11 MG/DL (ref 6–20)
BUN/CREAT SERPL: 13 (ref 9–23)
CALCIUM SERPL-MCNC: 9.2 MG/DL (ref 8.7–10.2)
CHLORIDE SERPL-SCNC: 104 MMOL/L (ref 96–106)
CHOLEST SERPL-MCNC: 148 MG/DL (ref 100–199)
CO2 SERPL-SCNC: 19 MMOL/L (ref 20–29)
CREAT SERPL-MCNC: 0.82 MG/DL (ref 0.57–1)
ERYTHROCYTE [DISTWIDTH] IN BLOOD BY AUTOMATED COUNT: 20.4 % (ref 11.7–15.4)
EST. AVERAGE GLUCOSE BLD GHB EST-MCNC: 117 MG/DL
GLOBULIN SER CALC-MCNC: 2.9 G/DL (ref 1.5–4.5)
GLUCOSE SERPL-MCNC: 83 MG/DL (ref 65–99)
HBA1C MFR BLD: 5.7 % (ref 4.8–5.6)
HCT VFR BLD AUTO: 32.3 % (ref 34–46.6)
HDLC SERPL-MCNC: 51 MG/DL
HGB BLD-MCNC: 9.7 G/DL (ref 11.1–15.9)
LDLC SERPL CALC-MCNC: 84 MG/DL (ref 0–99)
MCH RBC QN AUTO: 21.3 PG (ref 26.6–33)
MCHC RBC AUTO-ENTMCNC: 30 G/DL (ref 31.5–35.7)
MCV RBC AUTO: 71 FL (ref 79–97)
PLATELET # BLD AUTO: 240 X10E3/UL (ref 150–450)
POTASSIUM SERPL-SCNC: 4.1 MMOL/L (ref 3.5–5.2)
PROT SERPL-MCNC: 7.3 G/DL (ref 6–8.5)
RBC # BLD AUTO: 4.56 X10E6/UL (ref 3.77–5.28)
SODIUM SERPL-SCNC: 139 MMOL/L (ref 134–144)
TRIGL SERPL-MCNC: 64 MG/DL (ref 0–149)
TSH SERPL DL<=0.005 MIU/L-ACNC: 2.28 UIU/ML (ref 0.45–4.5)
VLDLC SERPL CALC-MCNC: 13 MG/DL (ref 5–40)
WBC # BLD AUTO: 6 X10E3/UL (ref 3.4–10.8)

## 2020-03-14 DIAGNOSIS — D50.8 OTHER IRON DEFICIENCY ANEMIA: Primary | ICD-10-CM

## 2020-03-14 RX ORDER — FERROUS SULFATE 325(65) MG
325 TABLET, DELAYED RELEASE (ENTERIC COATED) ORAL
Qty: 30 TAB | Refills: 2 | Status: SHIPPED | OUTPATIENT
Start: 2020-03-14 | End: 2020-08-20 | Stop reason: SDUPTHER

## 2020-06-09 ENCOUNTER — VIRTUAL VISIT (OUTPATIENT)
Dept: PRIMARY CARE CLINIC | Age: 30
End: 2020-06-09

## 2020-06-09 DIAGNOSIS — R20.0 NUMBNESS AND TINGLING: Primary | ICD-10-CM

## 2020-06-09 DIAGNOSIS — G43.019 INTRACTABLE MIGRAINE WITHOUT AURA AND WITHOUT STATUS MIGRAINOSUS: ICD-10-CM

## 2020-06-09 DIAGNOSIS — D50.8 OTHER IRON DEFICIENCY ANEMIA: ICD-10-CM

## 2020-06-09 DIAGNOSIS — M79.89 SWELLING OF FINGER: ICD-10-CM

## 2020-06-09 DIAGNOSIS — R20.2 NUMBNESS AND TINGLING: Primary | ICD-10-CM
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
## 2020-06-11 LAB
ALBUMIN SERPL-MCNC: 4.1 G/DL (ref 3.9–5)
ALBUMIN/GLOB SERPL: 1.4 {RATIO} (ref 1.2–2.2)
ALP SERPL-CCNC: 33 IU/L (ref 39–117)
ALT SERPL-CCNC: 12 IU/L (ref 0–32)
ANA SER QL: NEGATIVE
AST SERPL-CCNC: 21 IU/L (ref 0–40)
BILIRUB SERPL-MCNC: 0.3 MG/DL (ref 0–1.2)
BUN SERPL-MCNC: 12 MG/DL (ref 6–20)
BUN/CREAT SERPL: 14 (ref 9–23)
CALCIUM SERPL-MCNC: 8.8 MG/DL (ref 8.7–10.2)
CHLORIDE SERPL-SCNC: 108 MMOL/L (ref 96–106)
CO2 SERPL-SCNC: 20 MMOL/L (ref 20–29)
CREAT SERPL-MCNC: 0.84 MG/DL (ref 0.57–1)
ERYTHROCYTE [DISTWIDTH] IN BLOOD BY AUTOMATED COUNT: 21.4 % (ref 11.7–15.4)
GLOBULIN SER CALC-MCNC: 3 G/DL (ref 1.5–4.5)
GLUCOSE SERPL-MCNC: 85 MG/DL (ref 65–99)
HCT VFR BLD AUTO: 28.6 % (ref 34–46.6)
HGB BLD-MCNC: 8.4 G/DL (ref 11.1–15.9)
MCH RBC QN AUTO: 20.2 PG (ref 26.6–33)
MCHC RBC AUTO-ENTMCNC: 29.4 G/DL (ref 31.5–35.7)
MCV RBC AUTO: 69 FL (ref 79–97)
PLATELET # BLD AUTO: 279 X10E3/UL (ref 150–450)
POTASSIUM SERPL-SCNC: 4.1 MMOL/L (ref 3.5–5.2)
PROT SERPL-MCNC: 7.1 G/DL (ref 6–8.5)
RBC # BLD AUTO: 4.16 X10E6/UL (ref 3.77–5.28)
SODIUM SERPL-SCNC: 142 MMOL/L (ref 134–144)
VIT B12 SERPL-MCNC: 730 PG/ML (ref 232–1245)
WBC # BLD AUTO: 4.7 X10E3/UL (ref 3.4–10.8)

## 2020-06-17 NOTE — PROGRESS NOTES
Result discussed with patient. She is taking iron pills. Recommended seeing Gynecologist for menorrhagia.

## 2020-06-23 DIAGNOSIS — J45.21 MILD INTERMITTENT ASTHMA WITH ACUTE EXACERBATION: Primary | ICD-10-CM

## 2020-06-23 RX ORDER — BUDESONIDE AND FORMOTEROL FUMARATE DIHYDRATE 80; 4.5 UG/1; UG/1
2 AEROSOL RESPIRATORY (INHALATION) 2 TIMES DAILY
Qty: 1 INHALER | Refills: 1 | Status: SHIPPED | OUTPATIENT
Start: 2020-06-23 | End: 2020-11-02 | Stop reason: SDUPTHER

## 2020-08-20 DIAGNOSIS — D50.8 OTHER IRON DEFICIENCY ANEMIA: ICD-10-CM

## 2020-08-20 RX ORDER — FERROUS SULFATE 325(65) MG
325 TABLET, DELAYED RELEASE (ENTERIC COATED) ORAL
Qty: 30 TAB | Refills: 2 | Status: SHIPPED | OUTPATIENT
Start: 2020-08-20 | End: 2021-01-08 | Stop reason: SDUPTHER

## 2020-08-20 NOTE — TELEPHONE ENCOUNTER
Last office visit 6/9/2020  Last med refill 3/14/2020Can I please have more refills on my Ferrous sulf ec 325 mg tablets?

## 2020-10-22 ENCOUNTER — PATIENT MESSAGE (OUTPATIENT)
Dept: PRIMARY CARE CLINIC | Age: 30
End: 2020-10-22

## 2020-10-23 NOTE — TELEPHONE ENCOUNTER
Call placed to patient to schedule telemed visit to discuss asthma concerns. No answer. Left voice message informing of appointment time also sent patient a [email or web address] message to inform of appointment time of 12p on 10/26.  Asked that she reply if that time does not work for her

## 2020-10-23 NOTE — TELEPHONE ENCOUNTER
From: Leona Padron  Sent: 10/23/2020 7:54 AM EDT  To: [facility]  Subject: RE: Non-Urgent Medical Question    I return to school in November. Yes, I can video next week. What day and times are available?    ----- Message -----  From: Dany Lizama MD  Sent: 10/23/20 7:50 AM  To: Leona Padron  Subject: RE: Non-Urgent Medical Question    No worries, we can discuss a plan . Can you do a video chat next week? When are you returning back to school ?      ----- Message -----   From:Mireya Hurd   Sent:10/22/2020 9:51 PM EDT   Jelly Mujica MD   [address] [place] [address]  I am writing you to set up an action plan for my asthma. 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 I just recieved news that we will be returning back to school in person. I need help with a asthma plan. With recent experience I know that when I wear my mask for more than an hour I have experienced issues breathing and have had chest pain issues. What I need to know from you how I can reduce this issue ? Will I need more steroids? Also can I please have a refill on my rescue inhaler albuterol as well in preparation for return to work?

## 2020-11-02 ENCOUNTER — VIRTUAL VISIT (OUTPATIENT)
Dept: PRIMARY CARE CLINIC | Age: 30
End: 2020-11-02
Payer: COMMERCIAL

## 2020-11-02 DIAGNOSIS — J30.89 ENVIRONMENTAL AND SEASONAL ALLERGIES: ICD-10-CM

## 2020-11-02 DIAGNOSIS — D50.8 OTHER IRON DEFICIENCY ANEMIA: ICD-10-CM

## 2020-11-02 DIAGNOSIS — J45.21 MILD INTERMITTENT ASTHMA WITH ACUTE EXACERBATION: Primary | ICD-10-CM

## 2020-11-02 DIAGNOSIS — G43.019 INTRACTABLE MIGRAINE WITHOUT AURA AND WITHOUT STATUS MIGRAINOSUS: ICD-10-CM

## 2020-11-02 PROCEDURE — 99214 OFFICE O/P EST MOD 30 MIN: CPT | Performed by: INTERNAL MEDICINE

## 2020-11-02 RX ORDER — ALBUTEROL SULFATE 90 UG/1
1 AEROSOL, METERED RESPIRATORY (INHALATION)
Qty: 1 INHALER | Refills: 1 | Status: SHIPPED | OUTPATIENT
Start: 2020-11-02 | End: 2020-12-02

## 2020-11-02 RX ORDER — BUDESONIDE AND FORMOTEROL FUMARATE DIHYDRATE 80; 4.5 UG/1; UG/1
2 AEROSOL RESPIRATORY (INHALATION) 2 TIMES DAILY
Qty: 1 INHALER | Refills: 1 | Status: SHIPPED | OUTPATIENT
Start: 2020-11-02 | End: 2021-08-08 | Stop reason: SDUPTHER

## 2020-11-02 RX ORDER — MONTELUKAST SODIUM 10 MG/1
10 TABLET ORAL DAILY
Qty: 30 TAB | Refills: 2 | Status: SHIPPED | OUTPATIENT
Start: 2020-11-02 | End: 2021-01-31

## 2020-11-02 NOTE — PROGRESS NOTES
Written by Dayanna Zamudio, as dictated by Dr. Eliana Clark MD.    Lilliam Sullivan is a 35 y.o. female. HPI   I was in the office while conducting this encounter. Consent:  She and/or her healthcare decision maker is aware that this patient-initiated Telehealth encounter is a billable service, with coverage as determined by her insurance carrier. She is aware that she may receive a bill and has provided verbal consent to proceed: Yes    This virtual visit was conducted via [employer]. Pursuant to the emergency declaration under the [address] [facility], 1135 waiver authority and the [email or web address] Act, this Virtual  Visit was conducted to reduce the patient's risk of exposure to COVID-19 and provide continuity of care for an established patient. Services were provided through a video synchronous discussion virtually to substitute for in-person clinic visit. Due to this being a TeleHealth evaluation, many elements of the physical examination are unable to be assessed. Due to technical difficulties, the scheduled MyChart visit was completed over Doxy instead. Pt presents virtually today to discuss her asthma and needed accommodations at her job. She teaches at [employer], and she is going to return to teaching in-person in late November. She wants to go back to teaching in-person, and she teaches a third grade class. When she wears a facemask for over an hour at a time, she starts to get chest pain and worries that an asthma attack is going to start. She can wear a face shield more easily, and she inquires if she can get a letter to have an accommodation for her to take short breaks to breathe without her mask on throughout the workday. She also needs a refill of her Symbicort for asthma. She also continues on Singulair and Zyrtec.     She is still taking her iron tablets, and she is due to have labs done. She follows with neurology and is taking butalbital PRN for her migraines. Patient Active Problem List   Diagnosis Code    Chronic non-seasonal allergic rhinitis J30.89    Mild intermittent asthma with acute exacerbation J45.21    Overweight (BMI 25.0-29. 9) E66.3    Thyroid nodule E04.1        Current Outpatient Medications on File Prior to Visit   Medication Sig Dispense Refill    ferrous sulfate (IRON) 325 mg (65 mg iron) EC tablet Take 1 Tab by mouth Daily (before breakfast) for 90 days. 30 Tab 2    [DISCONTINUED] budesonide-formoteroL (SYMBICORT) 80-4.5 mcg/actuation HFAA Take 2 Puffs by inhalation two (2) times a day. 1 Inhaler 1    cyclobenzaprine (FLEXERIL) 5 mg tablet Take 5 mg by mouth.  [DISCONTINUED] beclomethasone (QVAR) 80 mcg/actuation aero Take 1 Puff by inhalation two (2) times a day. 1 Inhaler 1    [DISCONTINUED] montelukast (SINGULAIR) 10 mg tablet       [DISCONTINUED] albuterol (PROVENTIL HFA, VENTOLIN HFA, PROAIR HFA) 90 mcg/actuation inhaler Take  by inhalation. No current facility-administered medications on file prior to visit. Allergies   Allergen Reactions    Pcn [Penicillins] Anaphylaxis    Banana Rash       Past Medical History:   Diagnosis Date    Allergic rhinitis     Asthma        No past surgical history on file.     Family History   Problem Relation Age of Onset    Diabetes Mother         decsd at age 48 s/p cellulitis and amputation    Heart Failure Mother     Kidney Disease Mother     No Known Problems Father         not close    Diabetes Maternal Grandmother     Kidney Disease Maternal Grandmother        Social History     Socioeconomic History    Marital status: SINGLE     Spouse name: Not on file    Number of children: 0    Years of education: Not on file    Highest education level: Not on file   Occupational History    Occupation:    Social Needs    Financial resource strain: Not on file  Food insecurity     Worry: Not on file     Inability: Not on file    Transportation needs     Medical: Not on file     Non-medical: Not on file   Tobacco Use    Smoking status: Never Smoker    Smokeless tobacco: Never Used   Substance and Sexual Activity    Alcohol use: No    Drug use: No    Sexual activity: Never   Lifestyle    Physical activity     Days per week: Not on file     Minutes per session: Not on file    Stress: Not on file   Relationships    Social connections     Talks on phone: Not on file     Gets together: Not on file     Attends [employer] service: Not on file     Active member of club or organization: Not on file     Attends meetings of clubs or organizations: Not on file     Relationship status: Not on file    Intimate partner violence     Fear of current or ex partner: Not on file     Emotionally abused: Not on file     Physically abused: Not on file     Forced sexual activity: Not on file   Other Topics Concern    Not on file   Social History Narrative    12/14/17        Lives with 3 roommates. Feeling safe at home. No pets       No visits with results within 3 Month(s) from this visit.    Latest known visit with results is:   Virtual Visit on 06/09/2020   Component Date Value Ref Range Status    Glucose 06/10/2020 85  65 - 99 mg/dL Final    BUN 06/10/2020 12  6 - 20 mg/dL Final    Creatinine 06/10/2020 0.84  0.57 - 1.00 mg/dL Final    GFR est non-AA 06/10/2020 94  >59 mL/min/1.73 Final    GFR est AA 06/10/2020 109  >59 mL/min/1.73 Final    BUN/Creatinine ratio 06/10/2020 14  9 - 23 Final    Sodium 06/10/2020 142  134 - 144 mmol/L Final    Potassium 06/10/2020 4.1  3.5 - 5.2 mmol/L Final    Chloride 06/10/2020 108* 96 - 106 mmol/L Final    CO2 06/10/2020 20  20 - 29 mmol/L Final    Calcium 06/10/2020 8.8  8.7 - 10.2 mg/dL Final    Protein, total 06/10/2020 7.1  6.0 - 8.5 g/dL Final    Albumin 06/10/2020 4.1  3.9 - 5.0 g/dL Final    GLOBULIN, TOTAL 06/10/2020 3.0  1.5 - 4.5 g/dL Final    A-G Ratio 06/10/2020 1.4  1.2 - 2.2 Final    Bilirubin, total 06/10/2020 0.3  0.0 - 1.2 mg/dL Final    Alk. phosphatase 06/10/2020 33* 39 - 117 IU/L Final    AST (SGOT) 06/10/2020 21  0 - 40 IU/L Final    ALT (SGPT) 06/10/2020 12  0 - 32 IU/L Final    WBC 06/10/2020 4.7  3.4 - 10.8 x10E3/uL Final    RBC 06/10/2020 4.16  3.77 - 5.28 x10E6/uL Final    HGB 06/10/2020 8.4* 11.1 - 15.9 g/dL Final    HCT 06/10/2020 28.6* 34.0 - 46.6 % Final    MCV 06/10/2020 69* 79 - 97 fL Final    MCH 06/10/2020 20.2* 26.6 - 33.0 pg Final    MCHC 06/10/2020 29.4* 31.5 - 35.7 g/dL Final    RDW 06/10/2020 21.4* 11.7 - 15.4 % Final    PLATELET 75/26/2122 532  150 - 450 x10E3/uL Final    Antinuclear Antibodies Direct 06/10/2020 Negative  Negative Final    Vitamin B12 06/10/2020 730  232 - 1,245 pg/mL Final     Review of Systems   Constitutional: Negative for malaise/fatigue and weight loss. HENT: Negative for congestion and sore throat. Eyes: Negative for blurred vision. Respiratory: Negative for cough and shortness of breath. +asthma   Cardiovascular: Negative for chest pain and leg swelling. Gastrointestinal: Negative for constipation and heartburn. Genitourinary: Negative for frequency and urgency. Musculoskeletal: Negative for back pain, joint pain and myalgias. Neurological: Positive for headaches (occasional migraines). Negative for dizziness. Endo/Heme/Allergies: Positive for environmental allergies. Psychiatric/Behavioral: Negative for depression. The patient is not nervous/anxious and does not have insomnia. There were no vitals taken for this visit. Physical Exam  Nursing note reviewed. Constitutional:       Appearance: Normal appearance. She is well-developed and well-groomed. HENT:      Head: Normocephalic and atraumatic. Nose: No congestion. Eyes:      General:         Right eye: No discharge. Left eye: No discharge.    Pulmonary: Effort: Pulmonary effort is normal.      Breath sounds: No wheezing. Neurological:      Mental Status: She is alert and oriented to person, place, and time. Psychiatric:         Mood and Affect: Mood normal.         Behavior: Behavior normal.       ASSESSMENT and PLAN    ICD-10-CM ICD-9-CM    1. Mild intermittent asthma with acute exacerbation  J45.21 493.92 budesonide-formoteroL (SYMBICORT) 80-4.5 mcg/actuation HFAA sent to pharmacy. I refilled her Symbicort. albuterol (PROVENTIL HFA, VENTOLIN HFA, PROAIR HFA) 90 mcg/actuation inhaler sent to pharmacy. I refilled her albuterol inhaler. I also provided a letter for her to have accommodations at work allowing her to take breaks from wearing her mask. 2. Intractable migraine without aura and without status migrainosus  G43.019 346.11 Stable, follows with neurology and takes butalbital PRN. 3. Environmental and seasonal allergies  J30.89 477.8 montelukast (SINGULAIR) 10 mg tablet sent to pharmacy. I refilled her Singulair. 4. Other iron deficiency anemia  D50.8 280.8 CBC WITH AUTOMATED DIFF    I ordered labs for her to come in and have done soon. Continues on iron tablets. This plan was reviewed with the patient and patient agrees. All questions were answered. This scribe documentation was reviewed by me and accurately reflects the examination and decisions made by me.

## 2020-11-02 NOTE — LETTER
11/2/2020 3:52 PM 
 
Ms. Lan [facility] [address] [id number] To whom it may concern: 
 
 
Due to her asthma the patient cannot keep her mask on for extended periods of time and needs breaks to take it off so that she will be able to breathe and will not have an asthma attack. Sincerely, Rodney Senior MD

## 2021-01-08 DIAGNOSIS — D50.8 OTHER IRON DEFICIENCY ANEMIA: ICD-10-CM

## 2021-01-08 RX ORDER — FERROUS SULFATE 325(65) MG
325 TABLET, DELAYED RELEASE (ENTERIC COATED) ORAL
Qty: 30 TAB | Refills: 2 | Status: SHIPPED | OUTPATIENT
Start: 2021-01-08 | End: 2021-05-04 | Stop reason: SDUPTHER

## 2021-02-19 ENCOUNTER — VIRTUAL VISIT (OUTPATIENT)
Dept: PRIMARY CARE CLINIC | Age: 31
End: 2021-02-19
Payer: COMMERCIAL

## 2021-02-19 DIAGNOSIS — N93.9 VAGINAL SPOTTING: ICD-10-CM

## 2021-02-19 DIAGNOSIS — J45.21 MILD INTERMITTENT ASTHMA WITH ACUTE EXACERBATION: ICD-10-CM

## 2021-02-19 DIAGNOSIS — G89.29 CHRONIC RIGHT-SIDED LOW BACK PAIN WITHOUT SCIATICA: Primary | ICD-10-CM

## 2021-02-19 DIAGNOSIS — M54.50 CHRONIC RIGHT-SIDED LOW BACK PAIN WITHOUT SCIATICA: Primary | ICD-10-CM

## 2021-02-19 DIAGNOSIS — G43.019 INTRACTABLE MIGRAINE WITHOUT AURA AND WITHOUT STATUS MIGRAINOSUS: ICD-10-CM

## 2021-02-19 PROCEDURE — 99214 OFFICE O/P EST MOD 30 MIN: CPT | Performed by: INTERNAL MEDICINE

## 2021-02-19 NOTE — PROGRESS NOTES
Macy Dodson (: 1990) is a 28 y.o. female, established patient, here for evaluation of the following chief complaint(s):   No chief complaint on file. Written by Michael Bagley, as dictated by Dr. Liz Gonzalez MD.    ASSESSMENT/PLAN:  1. Chronic right-sided low back pain without sciatica  -     XR SPINE LUMB 2 OR 3 V; Future  I ordered an XR and instructed her to call [phone] to find the most convenient location to have it done.    -     REFERRAL TO PHYSICAL THERAPY  I referred her to Dr. Damian Pham for PT and instructed her to call [phone] to schedule an appt once her XR report comes back. 2. Intractable migraine without aura and without status migrainosus  Stable, she has not been getting very many migraines recently and when she does get them she has been taking Advil PRN. 3. Vaginal spotting  I recommended she talk to her gynecologist about the spotting as it likely is not related to her R sided low back pain. She has started a new birth control pill recently. 4. Mild intermittent asthma with acute exacerbation  Stable, she continues on Symbicort every day. SUBJECTIVE/OBJECTIVE:  HPI  Pt presents virtually today c/o intermittent bursts of sharp pain in her R lower back, radiating to her R side. This pain has been occurring 3-4x a day for 2 months now. At first, she was wondering if it as coming from dehydration so she started drinking lots of water, but the pain persists. Ibuprofen does not provide any relief from this pain. The pain is exacerbated by standing up from a sitting position but she denies any sciatica. She has been getting some abnormal bleeding between her menstrual periods and went to her gynecologist who let her know this is just related to ovulation. She is still getting migraines but they have become much less frequent, maybe once a week. She takes ibuprofen PRN for these.      She continues on Symbicort for her asthma and has been doing well on this. Patient Active Problem List   Diagnosis Code    Chronic non-seasonal allergic rhinitis J30.89    Mild intermittent asthma with acute exacerbation J45.21    Overweight (BMI 25.0-29. 9) E66.3    Thyroid nodule E04.1        Current Outpatient Medications on File Prior to Visit   Medication Sig Dispense Refill    ferrous sulfate (IRON) 325 mg (65 mg iron) EC tablet Take 1 Tab by mouth Daily (before breakfast) for 90 days. 30 Tab 2    budesonide-formoteroL (SYMBICORT) 80-4.5 mcg/actuation HFAA Take 2 Puffs by inhalation two (2) times a day. 1 Inhaler 1    cyclobenzaprine (FLEXERIL) 5 mg tablet Take 5 mg by mouth. No current facility-administered medications on file prior to visit. Allergies   Allergen Reactions    Pcn [Penicillins] Anaphylaxis    Banana Rash       Past Medical History:   Diagnosis Date    Allergic rhinitis     Asthma        No past surgical history on file.     Family History   Problem Relation Age of Onset    Diabetes Mother         decsd at age 48 s/p cellulitis and amputation    Heart Failure Mother     Kidney Disease Mother     No Known Problems Father         not close    Diabetes Maternal Grandmother     Kidney Disease Maternal Grandmother        Social History     Socioeconomic History    Marital status: SINGLE     Spouse name: Not on file    Number of children: 0    Years of education: Not on file    Highest education level: Not on file   Occupational History    Occupation:    Social Needs    Financial resource strain: Not on file    Food insecurity     Worry: Not on file     Inability: Not on file    Transportation needs     Medical: Not on file     Non-medical: Not on file   Tobacco Use    Smoking status: Never Smoker    Smokeless tobacco: Never Used   Substance and Sexual Activity    Alcohol use: No    Drug use: No    Sexual activity: Never   Lifestyle    Physical activity     Days per week: Not on file Minutes per session: Not on file    Stress: Not on file   Relationships    Social connections     Talks on phone: Not on file     Gets together: Not on file     Attends [employer] service: Not on file     Active member of club or organization: Not on file     Attends meetings of clubs or organizations: Not on file     Relationship status: Not on file    Intimate partner violence     Fear of current or ex partner: Not on file     Emotionally abused: Not on file     Physically abused: Not on file     Forced sexual activity: Not on file   Other Topics Concern    Not on file   Social History Narrative    12/14/17        Lives with 3 roommates. Feeling safe at home. No pets       No visits with results within 3 Month(s) from this visit. Latest known visit with results is:   Virtual Visit on 06/09/2020   Component Date Value Ref Range Status    Glucose 06/10/2020 85  65 - 99 mg/dL Final    BUN 06/10/2020 12  6 - 20 mg/dL Final    Creatinine 06/10/2020 0.84  0.57 - 1.00 mg/dL Final    GFR est non-AA 06/10/2020 94  >59 mL/min/1.73 Final    GFR est AA 06/10/2020 109  >59 mL/min/1.73 Final    BUN/Creatinine ratio 06/10/2020 14  9 - 23 Final    Sodium 06/10/2020 142  134 - 144 mmol/L Final    Potassium 06/10/2020 4.1  3.5 - 5.2 mmol/L Final    Chloride 06/10/2020 108* 96 - 106 mmol/L Final    CO2 06/10/2020 20  20 - 29 mmol/L Final    Calcium 06/10/2020 8.8  8.7 - 10.2 mg/dL Final    Protein, total 06/10/2020 7.1  6.0 - 8.5 g/dL Final    Albumin 06/10/2020 4.1  3.9 - 5.0 g/dL Final    GLOBULIN, TOTAL 06/10/2020 3.0  1.5 - 4.5 g/dL Final    A-G Ratio 06/10/2020 1.4  1.2 - 2.2 Final    Bilirubin, total 06/10/2020 0.3  0.0 - 1.2 mg/dL Final    Alk.  phosphatase 06/10/2020 33* 39 - 117 IU/L Final    AST (SGOT) 06/10/2020 21  0 - 40 IU/L Final    ALT (SGPT) 06/10/2020 12  0 - 32 IU/L Final    WBC 06/10/2020 4.7  3.4 - 10.8 x10E3/uL Final    RBC 06/10/2020 4.16  3.77 - 5.28 x10E6/uL Final    HGB 06/10/2020 8.4* 11.1 - 15.9 g/dL Final    HCT 06/10/2020 28.6* 34.0 - 46.6 % Final    MCV 06/10/2020 69* 79 - 97 fL Final    MCH 06/10/2020 20.2* 26.6 - 33.0 pg Final    MCHC 06/10/2020 29.4* 31.5 - 35.7 g/dL Final    RDW 06/10/2020 21.4* 11.7 - 15.4 % Final    PLATELET 05/32/3694 699  150 - 450 x10E3/uL Final    Antinuclear Antibodies Direct 06/10/2020 Negative  Negative Final    Vitamin B12 06/10/2020 730  232 - 1,245 pg/mL Final     Review of Systems   Constitutional: Negative for activity change, fatigue and unexpected weight change. HENT: Negative for congestion, hearing loss, rhinorrhea and sore throat. Eyes: Negative for discharge. Respiratory: Negative for cough, chest tightness and shortness of breath. Cardiovascular: Negative for leg swelling. Gastrointestinal: Negative for abdominal pain, constipation and diarrhea. Genitourinary: Positive for vaginal bleeding. Negative for dysuria, flank pain, frequency and urgency. Musculoskeletal: Positive for back pain (lower R). Negative for arthralgias and myalgias. Skin: Negative for color change and rash. Neurological: Positive for headaches (migraines). Negative for dizziness and light-headedness. Psychiatric/Behavioral: Negative for dysphoric mood and sleep disturbance. The patient is not nervous/anxious.          Patient-Reported Vitals 2/19/2021   Patient-Reported Weight -   Patient-Reported Height 5 9   Patient-Reported Temperature -   Patient-Reported LMP 2/6/2021       Physical Exam    [INSTRUCTIONS:  \"[x]\" Indicates a positive item  \"[]\" Indicates a negative item  -- DELETE ALL ITEMS NOT EXAMINED]    Constitutional: [x] Appears well-developed and well-nourished [x] No apparent distress      [] Abnormal -     Mental status: [x] Alert and awake  [x] Oriented to person/place/time [x] Able to follow commands    [] Abnormal -     Eyes:   EOM    [x]  Normal    [] Abnormal -   Sclera  [x]  Normal    [] Abnormal -          Discharge [x]  None visible   [] Abnormal -     HENT: [x] Normocephalic, atraumatic  [] Abnormal -   [x] Mouth/Throat: Mucous membranes are moist    External Ears [x] Normal  [] Abnormal -    Neck: [x] No visualized mass [] Abnormal -     Pulmonary/Chest: [x] Respiratory effort normal   [x] No visualized signs of difficulty breathing or respiratory distress        [] Abnormal -      Musculoskeletal:   [x] Normal gait with no signs of ataxia         [x] Normal range of motion of neck        [] Abnormal -     Neurological:        [x] No Facial Asymmetry (Cranial nerve 7 motor function) (limited exam due to video visit)          [x] No gaze palsy        [] Abnormal -          Skin:        [x] No significant exanthematous lesions or discoloration noted on facial skin         [] Abnormal -            Psychiatric:       [x] Normal Affect [] Abnormal -        [x] No Hallucinations    Other pertinent observable physical exam findings:-    Janine Herrera is being evaluated by a Virtual Visit (video visit) encounter to address concerns as mentioned above. . Due to this being a TeleHealth encounter (During [facility]- public health emergency), evaluation of the following organ systems was limited: Vitals/Constitutional/EENT/Resp/CV/GI//MS/Neuro/Skin/Heme-Lymph-Imm. Pursuant to the emergency declaration under the [address] [place] [address] authority and the [employer] and [employer] Act, this Virtual Visit was conducted with patient's (and/or legal guardian's) consent, to reduce the patient's risk of exposure to COVID-19 and provide necessary medical care. The patient (and/or legal guardian) has also been advised to contact this office for worsening conditions or problems, and seek emergency medical treatment and/or call 911 if deemed necessary.     Patient identification was verified at the start of the visit: YES    Services were provided through a video synchronous discussion virtually to substitute for in-person clinic visit. Patient was located at home and provider was located in office. This plan was reviewed with the patient and patient agrees. All questions were answered. This scribe documentation was reviewed by me and accurately reflects the examination and decisions made by me. An electronic signature was used to authenticate this note.   -- Grace Henry

## 2021-03-19 ENCOUNTER — HOSPITAL ENCOUNTER (OUTPATIENT)
Dept: GENERAL RADIOLOGY | Age: 31
Discharge: HOME | End: 2021-03-19
Attending: INTERNAL MEDICINE
Payer: COMMERCIAL

## 2021-03-19 DIAGNOSIS — G89.29 CHRONIC RIGHT-SIDED LOW BACK PAIN WITHOUT SCIATICA: ICD-10-CM

## 2021-03-19 DIAGNOSIS — M54.50 CHRONIC RIGHT-SIDED LOW BACK PAIN WITHOUT SCIATICA: ICD-10-CM

## 2021-03-19 PROCEDURE — 72100 X-RAY EXAM L-S SPINE 2/3 VWS: CPT

## 2021-05-04 ENCOUNTER — PATIENT MESSAGE (OUTPATIENT)
Dept: PRIMARY CARE CLINIC | Age: 31
End: 2021-05-04

## 2021-05-04 DIAGNOSIS — D50.8 OTHER IRON DEFICIENCY ANEMIA: ICD-10-CM

## 2021-05-04 RX ORDER — FERROUS SULFATE 325(65) MG
325 TABLET, DELAYED RELEASE (ENTERIC COATED) ORAL
Qty: 30 TAB | Refills: 2 | Status: SHIPPED | OUTPATIENT
Start: 2021-05-04 | End: 2021-08-08 | Stop reason: SDUPTHER

## 2021-05-04 NOTE — TELEPHONE ENCOUNTER
From: Seth Gordon  To: Estelle Castro MD  Sent: 5/4/2021 6:29 AM EDT  Subject: Referral Request    I need a refill on my ferrous sulf (iron pills)

## 2021-06-01 DIAGNOSIS — G44.221 CHRONIC TENSION-TYPE HEADACHE, INTRACTABLE: Primary | ICD-10-CM

## 2021-06-01 RX ORDER — IBUPROFEN 800 MG/1
800 TABLET ORAL
Qty: 30 TABLET | Refills: 0 | Status: SHIPPED | OUTPATIENT
Start: 2021-06-01 | End: 2021-09-29 | Stop reason: SDUPTHER

## 2021-08-08 DIAGNOSIS — D50.8 OTHER IRON DEFICIENCY ANEMIA: ICD-10-CM

## 2021-08-08 DIAGNOSIS — J45.21 MILD INTERMITTENT ASTHMA WITH ACUTE EXACERBATION: ICD-10-CM

## 2021-08-09 RX ORDER — FERROUS SULFATE 325(65) MG
325 TABLET, DELAYED RELEASE (ENTERIC COATED) ORAL
Qty: 30 TABLET | Refills: 2 | Status: SHIPPED | OUTPATIENT
Start: 2021-08-09 | End: 2022-07-18 | Stop reason: SDUPTHER

## 2021-08-09 RX ORDER — BUDESONIDE AND FORMOTEROL FUMARATE DIHYDRATE 80; 4.5 UG/1; UG/1
2 AEROSOL RESPIRATORY (INHALATION) 2 TIMES DAILY
Qty: 1 INHALER | Refills: 1 | Status: SHIPPED | OUTPATIENT
Start: 2021-08-09

## 2021-08-09 NOTE — TELEPHONE ENCOUNTER
Requested Prescriptions     Pending Prescriptions Disp Refills    budesonide-formoteroL (SYMBICORT) 80-4.5 mcg/actuation HFAA 1 Inhaler 1     Sig: Take 2 Puffs by inhalation two (2) times a day.  ferrous sulfate (IRON) 325 mg (65 mg iron) EC tablet 30 Tablet 2     Sig: Take 1 Tablet by mouth Daily (before breakfast) for 90 days.         Last Visit 6/9/20  Last Refill   Symbicort 11/2/20  Iron 5/4/21

## 2021-09-03 ENCOUNTER — VIRTUAL VISIT (OUTPATIENT)
Dept: PRIMARY CARE CLINIC | Age: 31
End: 2021-09-03
Payer: COMMERCIAL

## 2021-09-03 DIAGNOSIS — G43.009 MIGRAINE WITHOUT AURA AND WITHOUT STATUS MIGRAINOSUS, NOT INTRACTABLE: Primary | ICD-10-CM

## 2021-09-03 DIAGNOSIS — J45.20 MILD INTERMITTENT ASTHMA WITHOUT COMPLICATION: ICD-10-CM

## 2021-09-03 PROBLEM — J45.21 MILD INTERMITTENT ASTHMA WITH ACUTE EXACERBATION: Status: RESOLVED | Noted: 2017-12-14 | Resolved: 2021-09-03

## 2021-09-03 PROCEDURE — 99213 OFFICE O/P EST LOW 20 MIN: CPT | Performed by: INTERNAL MEDICINE

## 2021-09-03 RX ORDER — SUMATRIPTAN 20 MG/1
1 SPRAY NASAL AS NEEDED
Qty: 1 EACH | Refills: 1 | Status: SHIPPED | OUTPATIENT
Start: 2021-09-03 | End: 2021-10-03

## 2021-09-03 RX ORDER — ALBUTEROL SULFATE 90 UG/1
1 AEROSOL, METERED RESPIRATORY (INHALATION)
Qty: 18 G | Refills: 0 | Status: SHIPPED | OUTPATIENT
Start: 2021-09-03 | End: 2021-10-03

## 2021-09-03 NOTE — LETTER
NOTIFICATION OF RETURN TO WORK / SCHOOL    9/3/2021    Ms. Jonathan Dennis  [address]      To Whom It May Concern:    Jonathan Dennis was under the care of [facility] . She has a history of Migraine and gets flare up often . Her Headaches are not due to COVID. If there are questions or concerns please have the patient contact our office.         Sincerely,      Sami Steen MD

## 2021-09-03 NOTE — PROGRESS NOTES
I was in the office while conducting this encounter. Consent:  She and/or her healthcare decision maker is aware that this patient-initiated Telehealth encounter is a billable service, with coverage as determined by her insurance carrier. She is aware that she may receive a bill and has provided verbal consent to proceed: Yes    This virtual visit was conducted via [address] Pursuant to the emergency declaration under the [facility] waiver authority and the [employer] and [employer] Act, this Virtual  Visit was conducted to reduce the patient's risk of exposure to COVID-19 and provide continuity of care for an established patient. Services were provided through a video synchronous discussion virtually to substitute for in-person clinic visit. Due to this being a TeleHealth evaluation, many elements of the physical examination are unable to be assessed. SUBJECTIVE:   Ms. Bre Ely is a 28 y.o. female who is here virtually for migraine letter. Migraines: Well controlled with 1-2x/mo. She takes ibuprofen with relief of symptoms. 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 She needs the letter because headaches are listed as a symptom of COVID-19. She has not had the COVID-19 vaccine. She has not used sumatriptan or imitrex before. She has taken fioricet but states it made her feel jittery. She agrees to try sumatriptan for migraines. She requests letter to be sent to her [employer] account. Asthma: has been well controlled this summer. PMH:   Past Medical History:   Diagnosis Date    Allergic rhinitis     Asthma      PSH:  has no past surgical history on file. All: is allergic to pcn [penicillins] and banana.    MEDS:   Current Outpatient Medications   Medication Sig    SUMAtriptan (IMITREX) 20 mg/actuation nasal spray 1 [place] by Both Nostrils route as needed for Migraine for up to 30 days.  albuterol (PROVENTIL HFA, VENTOLIN HFA, PROAIR HFA) 90 mcg/actuation inhaler Take 1 Puff by inhalation every six (6) hours as needed for Wheezing for up to 30 days.  budesonide-formoteroL (SYMBICORT) 80-4.5 mcg/actuation HFAA Take 2 Puffs by inhalation two (2) times a day.  ferrous sulfate (IRON) 325 mg (65 mg iron) EC tablet Take 1 Tablet by mouth Daily (before breakfast) for 90 days.  ibuprofen (MOTRIN) 800 mg tablet Take 1 Tablet by mouth two (2) times daily as needed for Pain for up to 30 doses. No current facility-administered medications for this visit. FH: family history includes Diabetes in her maternal grandmother and mother; Heart Failure in her mother; Kidney Disease in her maternal grandmother and mother; No Known Problems in her father. SH:  reports that she has never smoked. She has never used smokeless tobacco. She reports that she does not drink alcohol and does not use drugs. Review of Systems - History obtained from the patient      OBJECTIVE:   Vitals: There were no vitals taken for this visit. Gen: Pleasant 28 y.o.  female in NAD. ASSESSMENT/ PLAN:     Diagnoses and all orders for this visit:    1. Migraine without aura and without status migrainosus, not intractable  -     SUMAtriptan (IMITREX) 20 mg/actuation nasal spray; 1 [place] by Both Nostrils route as needed for Migraine for up to 30 days. Letter provided for her work. 2. Mild intermittent asthma without complication  -     albuterol (PROVENTIL HFA, VENTOLIN HFA, PROAIR HFA) 90 mcg/actuation inhaler; Take 1 Puff by inhalation every six (6) hours as needed for Wheezing for up to 30 days. I have reviewed the patient's medications and risks/side effects/benefits were discussed. Diagnosis(-es) explained to patient and questions answered.      Written by Arthur Alexander as dictated by Maylin Kuo MD.

## 2021-09-29 DIAGNOSIS — G44.221 CHRONIC TENSION-TYPE HEADACHE, INTRACTABLE: ICD-10-CM

## 2021-09-30 RX ORDER — IBUPROFEN 800 MG/1
800 TABLET ORAL
Qty: 30 TABLET | Refills: 0 | Status: SHIPPED | OUTPATIENT
Start: 2021-09-30 | End: 2022-01-25 | Stop reason: SDUPTHER

## 2021-09-30 NOTE — TELEPHONE ENCOUNTER
Requested Prescriptions     Pending Prescriptions Disp Refills    ibuprofen (MOTRIN) 800 mg tablet 30 Tablet 0     Sig: Take 1 Tablet by mouth two (2) times daily as needed for Pain for up to 30 doses.         Last Visit 9/3/21  Last Refill 6/1/21

## 2022-01-25 DIAGNOSIS — G44.221 CHRONIC TENSION-TYPE HEADACHE, INTRACTABLE: ICD-10-CM

## 2022-01-25 RX ORDER — IBUPROFEN 800 MG/1
800 TABLET ORAL
Qty: 30 TABLET | Refills: 0 | Status: SHIPPED | OUTPATIENT
Start: 2022-01-25 | End: 2022-06-27 | Stop reason: SDUPTHER

## 2022-01-25 NOTE — TELEPHONE ENCOUNTER
Requested Prescriptions     Pending Prescriptions Disp Refills    ibuprofen (MOTRIN) 800 mg tablet 30 Tablet 0     Sig: Take 1 Tablet by mouth two (2) times daily as needed for Pain for up to 30 doses.         Last Visit 9/3/21  Last Refill 9/30/21

## 2022-03-19 PROBLEM — E66.3 OVERWEIGHT (BMI 25.0-29.9): Status: ACTIVE | Noted: 2017-12-14

## 2022-03-19 PROBLEM — E04.1 THYROID NODULE: Status: ACTIVE | Noted: 2019-09-06

## 2022-03-20 PROBLEM — J30.89 CHRONIC NON-SEASONAL ALLERGIC RHINITIS: Status: ACTIVE | Noted: 2017-12-14

## 2022-03-28 ENCOUNTER — HOSPITAL ENCOUNTER (EMERGENCY)
Age: 32
Discharge: HOME | End: 2022-03-28
Attending: STUDENT IN AN ORGANIZED HEALTH CARE EDUCATION/TRAINING PROGRAM
Payer: COMMERCIAL

## 2022-03-28 VITALS
SYSTOLIC BLOOD PRESSURE: 133 MMHG | OXYGEN SATURATION: 100 % | WEIGHT: 200 LBS | BODY MASS INDEX: 29.62 KG/M2 | RESPIRATION RATE: 16 BRPM | HEIGHT: 69 IN | DIASTOLIC BLOOD PRESSURE: 96 MMHG | TEMPERATURE: 98.4 F | HEART RATE: 109 BPM

## 2022-03-28 DIAGNOSIS — F41.0 ANXIETY ATTACK: ICD-10-CM

## 2022-03-28 DIAGNOSIS — J45.20 MILD INTERMITTENT ASTHMA WITHOUT COMPLICATION: Primary | ICD-10-CM

## 2022-03-28 PROCEDURE — 99283 EMERGENCY DEPT VISIT LOW MDM: CPT

## 2022-03-28 NOTE — ED PROVIDER NOTES
EMERGENCY DEPARTMENT HISTORY AND PHYSICAL EXAM      Date: 3/28/2022  Patient Name: Ryan Irving    History of Presenting Illness     Chief Complaint   Patient presents with    Wheezing     Pt arrives to ED via EMS with a cc of asthma attach that happened today at school; pt used inhaler with relief, then another attack happened; school nurse advised pt to come to ED for evaluation; pt denies difficulty breathing at this time; pt has hx of anxiety attacks, which was experienced today and prompted the asthma attack        History Provided By: Patient    HPI: Ryan Irving, 33 y.o. female with past medical history of anxiety, asthma, presents to the ED with cc of anxiety induced asthma attack. Patient reports she was at school when she had a panic attack. States that this usually triggers her asthma to come on, and she began to wheeze. Patient did have her usual albuterol inhaler with her, and took a puff at that time, which calmed her symptoms. States that shortly after the episode had seemingly abated, she again went into a severe episode of wheezing, making her feel like she cannot breathe and that there was something stuck in her throat. This prompted her to seek help with the school nurse, who called the ambulance to bring her to the ED. Patient states that she is feeling better at this time, and is no longer wheezing or feeling anxious. She states that she is ready for discharge. PCP: Deshawn Felix MD    No current facility-administered medications on file prior to encounter. Current Outpatient Medications on File Prior to Encounter   Medication Sig Dispense Refill    ibuprofen (MOTRIN) 800 mg tablet Take 1 Tablet by mouth two (2) times daily as needed for Pain for up to 30 doses. 30 Tablet 0    budesonide-formoteroL (SYMBICORT) 80-4.5 mcg/actuation HFAA Take 2 Puffs by inhalation two (2) times a day.  1 Inhaler 1       Past History     Past Medical History:  Past Medical History: Diagnosis Date    Allergic rhinitis     Asthma        Past Surgical History:  No past surgical history on file. Family History:  Family History   Problem Relation Age of Onset    Diabetes Mother         decsd at age 48 s/p cellulitis and amputation    Heart Failure Mother     Kidney Disease Mother     No Known Problems Father         not close    Diabetes Maternal Grandmother     Kidney Disease Maternal Grandmother        Social History:  Social History     Tobacco Use    Smoking status: Never Smoker    Smokeless tobacco: Never Used   Substance Use Topics    Alcohol use: No    Drug use: No       Allergies: Allergies   Allergen Reactions    Pcn [Penicillins] Anaphylaxis    Banana Rash         Review of Systems   Review of Systems   Constitutional: Negative for chills and fever. HENT: Negative for congestion and rhinorrhea. Eyes: Negative for visual disturbance. Respiratory: Negative for chest tightness and shortness of breath. Cardiovascular: Negative for chest pain and palpitations. Gastrointestinal: Negative for abdominal pain, diarrhea, nausea and vomiting. Genitourinary: Negative for dysuria, flank pain and hematuria. Musculoskeletal: Negative for back pain and neck pain. Skin: Negative for rash. Allergic/Immunologic: Negative for immunocompromised state. Neurological: Negative for dizziness, speech difficulty, weakness and headaches. Hematological: Negative for adenopathy. Psychiatric/Behavioral: Negative for dysphoric mood and suicidal ideas. Physical Exam   Physical Exam  Vitals and nursing note reviewed. Constitutional:       General: She is not in acute distress. Appearance: She is not ill-appearing or toxic-appearing. HENT:      Head: Normocephalic and atraumatic. Nose: Nose normal.      Mouth/Throat:      Mouth: Mucous membranes are moist.   Eyes:      Extraocular Movements: Extraocular movements intact.       Pupils: Pupils are equal, round, and reactive to light. Cardiovascular:      Rate and Rhythm: Normal rate and regular rhythm. Pulses: Normal pulses. Pulmonary:      Effort: Pulmonary effort is normal.      Breath sounds: No stridor, decreased air movement or transmitted upper airway sounds. No decreased breath sounds, wheezing, rhonchi or rales. Abdominal:      General: Abdomen is flat. There is no distension. Palpations: Abdomen is soft. Tenderness: There is no abdominal tenderness. Musculoskeletal:         General: Normal range of motion. Cervical back: Normal range of motion and neck supple. Skin:     General: Skin is warm and dry. Neurological:      General: No focal deficit present. Mental Status: She is alert and oriented to person, place, and time. Psychiatric:         Judgment: Judgment normal.         Diagnostic Study Results     Labs -   No results found for this or any previous visit (from the past 24 hour(s)). Radiologic Studies -   No orders to display     CT Results  (Last 48 hours)    None        CXR Results  (Last 48 hours)    None          Medical Decision Making   IMarlo MD am the first provider for this patient, and I am the attending of record for this patient encounter. I reviewed the vital signs, available nursing notes, past medical history, past surgical history, family history and social history. Vital Signs-Reviewed the patient's vital signs. Patient Vitals for the past 24 hrs:   Temp Pulse Resp BP SpO2   03/28/22 1418 98.4 °F (36.9 °C) (!) 109 16 (!) 133/96 100 %     Records Reviewed: Nursing Notes and Old Medical Records    Provider Notes (Medical Decision Making):   DDx: Asthma, anxiety    Patient is feeling better at this time, no longer wheezing on my exam.  Breathing comfortably on room air, saturating 100%. Will discharge to follow-up with PCP. Confirmed that she does have adequate supply of albuterol. ED Course:   Initial assessment performed.  The patient's presenting problems have been discussed, and they are in agreement with the care plan formulated and outlined with them. I have encouraged them to ask questions as they arise throughout their visit. Porter Mcgregor MD      Disposition:  Discharge      DISCHARGE PLAN:  1. Current Discharge Medication List        2. Follow-up Information     Follow up With Specialties Details Why Contact Info    Blondie Dubin, MD Internal Medicine, [place] [address] [id number] [address]  [phone]          3. Return to ED if worse     Diagnosis     Clinical Impression:   1. Mild intermittent asthma without complication    2. Anxiety attack        Attestations:    Porter Mcgregor MD    Please note that this dictation was completed with [employer], the computer voice recognition software. Quite often unanticipated grammatical, syntax, homophones, and other interpretive errors are inadvertently transcribed by the computer software. Please disregard these errors. Please excuse any errors that have escaped final proofreading. Thank you.

## 2022-04-08 ENCOUNTER — OFFICE VISIT (OUTPATIENT)
Dept: PRIMARY CARE CLINIC | Age: 32
End: 2022-04-08
Payer: COMMERCIAL

## 2022-04-08 VITALS
OXYGEN SATURATION: 99 % | HEART RATE: 80 BPM | TEMPERATURE: 97.8 F | DIASTOLIC BLOOD PRESSURE: 71 MMHG | BODY MASS INDEX: 29.95 KG/M2 | SYSTOLIC BLOOD PRESSURE: 107 MMHG | RESPIRATION RATE: 16 BRPM | HEIGHT: 69 IN | WEIGHT: 202.2 LBS

## 2022-04-08 DIAGNOSIS — F41.9 ANXIETY: ICD-10-CM

## 2022-04-08 DIAGNOSIS — Z11.59 NEED FOR HEPATITIS C SCREENING TEST: ICD-10-CM

## 2022-04-08 DIAGNOSIS — D50.8 OTHER IRON DEFICIENCY ANEMIA: ICD-10-CM

## 2022-04-08 DIAGNOSIS — J45.20 MILD INTERMITTENT ASTHMA WITHOUT COMPLICATION: Primary | ICD-10-CM

## 2022-04-08 PROCEDURE — 99214 OFFICE O/P EST MOD 30 MIN: CPT | Performed by: INTERNAL MEDICINE

## 2022-04-08 RX ORDER — FLUOXETINE 10 MG/1
10 CAPSULE ORAL DAILY
Qty: 30 CAPSULE | Refills: 0 | Status: SHIPPED | OUTPATIENT
Start: 2022-04-08 | End: 2022-05-08

## 2022-04-08 RX ORDER — ALBUTEROL SULFATE 90 UG/1
1 AEROSOL, METERED RESPIRATORY (INHALATION)
Qty: 18 G | Refills: 1 | Status: SHIPPED | OUTPATIENT
Start: 2022-04-08 | End: 2022-05-25 | Stop reason: SDUPTHER

## 2022-04-08 NOTE — PROGRESS NOTES
Casper Adan (: 1990) is a 33 y.o. female, established patient, here for evaluation of the following chief complaint(s):  Asthma (Would like some medication for asthma ) and Anxiety (having panic attacks was seen at the ER [facility]. Does see therapist Heather Hayes creative counseling thought pt would benefit from non daily medication. )     Written by Felipe Sullivan, as dictated by Dr. Alyssa Campbell MD.      ASSESSMENT/PLAN:  Below is the assessment and plan developed based on review of pertinent history, physical exam, labs, studies, and medications. 1. Mild intermittent asthma without complication  I refilled her prn Albuterol inhaler. -     albuterol (PROVENTIL HFA, VENTOLIN HFA, PROAIR HFA) 90 mcg/actuation inhaler; Take 1 Puff by inhalation every six (6) hours as needed for Wheezing for up to 30 days. , Normal, Disp-18 g, R-1 sent to pharmacy. 2. Anxiety  I rx'd Prozac 10 mg daily. Potential side effects were discussed. Continue follow-ups with therapy.   -     FLUoxetine (PROzac) 10 mg capsule; Take 1 Capsule by mouth daily for 30 days. , Normal, Disp-30 Capsule, R-0 sent to pharmacy. 3. Other iron deficiency anemia  Recheck CMP and CBC. Continue on iron tabs. -     METABOLIC PANEL, COMPREHENSIVE; Future  -     CBC W/O DIFF; Future    4. Need for hepatitis C screening test  Will check hepatitis C AB.   -     HEPATITIS C AB; Future    SUBJECTIVE/OBJECTIVE:  HPI   The patient presents today c/o increased panic attacks and anxiety. She feels like her anxiety has been triggering asthma attacks. States that she will feel SOB when she is anxious causing her to feel like she can't breath and start wheezing. She was seen at [facility] ED on 22 c/o anxiety induced asthma attack. She has been speaking to her therapist who recommended she talk to me about starting medication. She works as a  which has been very stressful and makes her feel more anxious.     She has been taking Aleks Alderman for seasonal allergies. She is taking an iron tab daily for anemia. Patient Active Problem List   Diagnosis Code    Chronic non-seasonal allergic rhinitis J30.89    Overweight (BMI 25.0-29. 9) E66.3    Thyroid nodule E04.1        Current Outpatient Medications on File Prior to Visit   Medication Sig Dispense Refill    ibuprofen (MOTRIN) 800 mg tablet Take 1 Tablet by mouth two (2) times daily as needed for Pain for up to 30 doses. 30 Tablet 0    budesonide-formoteroL (SYMBICORT) 80-4.5 mcg/actuation HFAA Take 2 Puffs by inhalation two (2) times a day. (Patient not taking: Reported on 4/8/2022) 1 Inhaler 1     No current facility-administered medications on file prior to visit. Allergies   Allergen Reactions    Pcn [Penicillins] Anaphylaxis    Banana Rash       Past Medical History:   Diagnosis Date    Allergic rhinitis     Asthma        History reviewed. No pertinent surgical history. Family History   Problem Relation Age of Onset    Diabetes Mother         decsd at age 48 s/p cellulitis and amputation    Heart Failure Mother     Kidney Disease Mother     No Known Problems Father         not close    Diabetes Maternal Grandmother     Kidney Disease Maternal Grandmother        Social History     Socioeconomic History    Marital status: SINGLE     Spouse name: Not on file    Number of children: 0    Years of education: Not on file    Highest education level: Not on file   Occupational History    Occupation:    Tobacco Use    Smoking status: Never Smoker    Smokeless tobacco: Never Used   Substance and Sexual Activity    Alcohol use: No    Drug use: No    Sexual activity: Never   Other Topics Concern    Not on file   Social History Narrative    12/14/17        Lives with 3 roommates. Feeling safe at home. No pets       No visits with results within 3 Month(s) from this visit.    Latest known visit with results is:   Virtual Visit on 06/09/2020   Component Date Value Ref Range Status    Glucose 06/10/2020 85  65 - 99 mg/dL Final    BUN 06/10/2020 12  6 - 20 mg/dL Final    Creatinine 06/10/2020 0.84  0.57 - 1.00 mg/dL Final    GFR est non-AA 06/10/2020 94  >59 mL/min/1.73 Final    GFR est AA 06/10/2020 109  >59 mL/min/1.73 Final    BUN/Creatinine ratio 06/10/2020 14  9 - 23 Final    Sodium 06/10/2020 142  134 - 144 mmol/L Final    Potassium 06/10/2020 4.1  3.5 - 5.2 mmol/L Final    Chloride 06/10/2020 108* 96 - 106 mmol/L Final    CO2 06/10/2020 20  20 - 29 mmol/L Final    Calcium 06/10/2020 8.8  8.7 - 10.2 mg/dL Final    Protein, total 06/10/2020 7.1  6.0 - 8.5 g/dL Final    Albumin 06/10/2020 4.1  3.9 - 5.0 g/dL Final    GLOBULIN, TOTAL 06/10/2020 3.0  1.5 - 4.5 g/dL Final    A-G Ratio 06/10/2020 1.4  1.2 - 2.2 Final    Bilirubin, total 06/10/2020 0.3  0.0 - 1.2 mg/dL Final    Alk. phosphatase 06/10/2020 33* 39 - 117 IU/L Final    AST (SGOT) 06/10/2020 21  0 - 40 IU/L Final    ALT (SGPT) 06/10/2020 12  0 - 32 IU/L Final    WBC 06/10/2020 4.7  3.4 - 10.8 x10E3/uL Final    RBC 06/10/2020 4.16  3.77 - 5.28 x10E6/uL Final    HGB 06/10/2020 8.4* 11.1 - 15.9 g/dL Final    HCT 06/10/2020 28.6* 34.0 - 46.6 % Final    MCV 06/10/2020 69* 79 - 97 fL Final    MCH 06/10/2020 20.2* 26.6 - 33.0 pg Final    MCHC 06/10/2020 29.4* 31.5 - 35.7 g/dL Final    RDW 06/10/2020 21.4* 11.7 - 15.4 % Final    PLATELET 28/36/2970 412  150 - 450 x10E3/uL Final    Antinuclear Antibodies Direct 06/10/2020 Negative  Negative Final    Vitamin B12 06/10/2020 730  232 - 1,245 pg/mL Final      Review of Systems   Constitutional: Negative for activity change, fatigue and unexpected weight change. HENT: Negative for congestion, hearing loss, rhinorrhea and sore throat. Eyes: Negative for discharge. Respiratory: Negative for cough, chest tightness and shortness of breath. Cardiovascular: Negative for leg swelling.    Gastrointestinal: Negative for abdominal pain, constipation and diarrhea. Genitourinary: Negative for dysuria, flank pain, frequency and urgency. Musculoskeletal: Negative for arthralgias, back pain and myalgias. Skin: Negative for color change and rash. Neurological: Negative for dizziness, light-headedness and headaches. Psychiatric/Behavioral: Negative for dysphoric mood and sleep disturbance. The patient is nervous/anxious. Visit Vitals  /71 (BP 1 Location: Right arm, BP Patient Position: Sitting, BP Cuff Size: Adult)   Pulse 80   Temp 97.8 °F (36.6 °C) (Temporal)   Resp 16   Ht 5' 9\" (1.753 m)   Wt 202 lb 3.2 oz (91.7 kg)   LMP 03/28/2022 (Exact Date)   SpO2 99%   BMI 29.86 kg/m²      Physical Exam  Vitals and nursing note reviewed. Constitutional:       General: She is not in acute distress. Appearance: Normal appearance. She is well-developed. She is not diaphoretic. HENT:      Right Ear: External ear normal.      Left Ear: External ear normal.   Eyes:      General: No scleral icterus. Right eye: No discharge. Left eye: No discharge. Extraocular Movements: Extraocular movements intact. Conjunctiva/sclera: Conjunctivae normal.   Cardiovascular:      Rate and Rhythm: Normal rate and regular rhythm. Pulmonary:      Effort: Pulmonary effort is normal.      Breath sounds: Normal breath sounds. No wheezing. Abdominal:      General: Bowel sounds are normal.      Palpations: Abdomen is soft. Tenderness: There is no abdominal tenderness. Musculoskeletal:      Cervical back: Normal range of motion and neck supple. Lymphadenopathy:      Cervical: No cervical adenopathy. Neurological:      Mental Status: She is alert and oriented to person, place, and time. Psychiatric:         Mood and Affect: Mood and affect normal.       An electronic signature was used to authenticate this note.   -- Lexus Paul

## 2022-04-08 NOTE — PROGRESS NOTES
Identified pt with two pt identifiers(name and ). Chief Complaint   Patient presents with    Asthma     Would like some medication for asthma     Anxiety     having panic attacks was seen at the ER [facility]. Does see therapist Zahraa Almazan creative counseling thought pt would benefit from non daily medication. 3 most recent PHQ Screens 2022   Little interest or pleasure in doing things Not at all   Feeling down, depressed, irritable, or hopeless Not at all   Total Score PHQ 2 0   Trouble falling or staying asleep, or sleeping too much Several days   Feeling tired or having little energy Several days   Poor appetite, weight loss, or overeating Several days   Feeling bad about yourself - or that you are a failure or have let yourself or your family down Not at all   Trouble concentrating on things such as school, work, reading, or watching TV Several days   Moving or speaking so slowly that other people could have noticed; or the opposite being so fidgety that others notice Several days   Thoughts of being better off dead, or hurting yourself in some way Not at all   PHQ 9 Score 5        Vitals:    22 1454   BP: 107/71   Pulse: 80   Resp: 16   Temp: 97.8 °F (36.6 °C)   TempSrc: Temporal   SpO2: 99%   Weight: 202 lb 3.2 oz (91.7 kg)   Height: 5' 9\" (1.753 m)   LMP: 2022       Health Maintenance Due   Topic    Hepatitis C Screening     COVID-19 Vaccine (1)    Pneumococcal 0-64 years (1 of 2 - PPSV23)    DTaP/Tdap/Td series (1 - Tdap)    Cervical cancer screen        1. Have you been to the ER, urgent care clinic since your last visit? Hospitalized since your last visit? Yes - [facility] for panic attacks     2. Have you seen or consulted any other health care providers outside of the [address] [place] [address] since your last visit? Include any pap smears or colon screening. No    3. For patients aged 39-70: Has the patient had a colonoscopy / FIT/ Cologuard?  No      If the patient is female:    4. For patients aged 41-77: Has the patient had a mammogram within the past 2 years? No      5. For patients aged 21-65: Has the patient had a pap smear? Yes - Care Gap present.  Rooming MA/LPN to request most recent results

## 2022-04-09 LAB
ALBUMIN SERPL-MCNC: 4.5 G/DL (ref 3.8–4.8)
ALBUMIN/GLOB SERPL: 1.4 {RATIO} (ref 1.2–2.2)
ALP SERPL-CCNC: 47 IU/L (ref 44–121)
ALT SERPL-CCNC: 14 IU/L (ref 0–32)
AST SERPL-CCNC: 15 IU/L (ref 0–40)
BILIRUB SERPL-MCNC: 0.4 MG/DL (ref 0–1.2)
BUN SERPL-MCNC: 12 MG/DL (ref 6–20)
BUN/CREAT SERPL: 13 (ref 9–23)
CALCIUM SERPL-MCNC: 9.2 MG/DL (ref 8.7–10.2)
CHLORIDE SERPL-SCNC: 103 MMOL/L (ref 96–106)
CO2 SERPL-SCNC: 21 MMOL/L (ref 20–29)
CREAT SERPL-MCNC: 0.9 MG/DL (ref 0.57–1)
EGFR: 88 ML/MIN/1.73
ERYTHROCYTE [DISTWIDTH] IN BLOOD BY AUTOMATED COUNT: 15.9 % (ref 11.7–15.4)
GLOBULIN SER CALC-MCNC: 3.3 G/DL (ref 1.5–4.5)
GLUCOSE SERPL-MCNC: 89 MG/DL (ref 65–99)
HCT VFR BLD AUTO: 38.1 % (ref 34–46.6)
HCV AB S/CO SERPL IA: <0.1 S/CO RATIO (ref 0–0.9)
HGB BLD-MCNC: 11.8 G/DL (ref 11.1–15.9)
MCH RBC QN AUTO: 24.4 PG (ref 26.6–33)
MCHC RBC AUTO-ENTMCNC: 31 G/DL (ref 31.5–35.7)
MCV RBC AUTO: 79 FL (ref 79–97)
PLATELET # BLD AUTO: 250 X10E3/UL (ref 150–450)
POTASSIUM SERPL-SCNC: 4.1 MMOL/L (ref 3.5–5.2)
PROT SERPL-MCNC: 7.8 G/DL (ref 6–8.5)
RBC # BLD AUTO: 4.83 X10E6/UL (ref 3.77–5.28)
SODIUM SERPL-SCNC: 140 MMOL/L (ref 134–144)
WBC # BLD AUTO: 6.6 X10E3/UL (ref 3.4–10.8)

## 2022-04-10 NOTE — PROGRESS NOTES
Results reviewed. Release via [employer], Great News! Your Hemoglobin ( blood count) has improved. Overall , blood work looks fine. Hope prozac is working fine.

## 2022-05-25 ENCOUNTER — PATIENT MESSAGE (OUTPATIENT)
Dept: PRIMARY CARE CLINIC | Age: 32
End: 2022-05-25

## 2022-05-25 DIAGNOSIS — J45.20 MILD INTERMITTENT ASTHMA WITHOUT COMPLICATION: ICD-10-CM

## 2022-05-25 RX ORDER — ALBUTEROL SULFATE 90 UG/1
1 AEROSOL, METERED RESPIRATORY (INHALATION)
Qty: 18 G | Refills: 1 | Status: SHIPPED | OUTPATIENT
Start: 2022-05-25 | End: 2022-06-24

## 2022-05-25 NOTE — TELEPHONE ENCOUNTER
Requested Prescriptions     Pending Prescriptions Disp Refills    albuterol (PROVENTIL HFA, VENTOLIN HFA, PROAIR HFA) 90 mcg/actuation inhaler 18 g 1     Sig: Take 1 Puff by inhalation every six (6) hours as needed for Wheezing for up to 30 days.         Last Visit 4/8/22  Last Refill 4/8/22

## 2022-07-15 ENCOUNTER — PATIENT MESSAGE (OUTPATIENT)
Dept: PRIMARY CARE CLINIC | Age: 32
End: 2022-07-15

## 2022-07-15 DIAGNOSIS — D50.8 OTHER IRON DEFICIENCY ANEMIA: ICD-10-CM

## 2022-07-18 RX ORDER — FERROUS SULFATE 325(65) MG
325 TABLET, DELAYED RELEASE (ENTERIC COATED) ORAL
Qty: 30 TABLET | Refills: 2 | Status: SHIPPED | OUTPATIENT
Start: 2022-07-18 | End: 2022-10-16

## 2022-07-18 NOTE — TELEPHONE ENCOUNTER
From: Sophia Perkins  To: Cornelius Ramirez MD  Sent: 7/15/2022 11:08 PM EDT  Subject: Iron medicine     Can I please get a refill of my ferrous sulfate (IRON) 325 mg (65 mg iron) EC tablet?

## 2022-07-18 NOTE — TELEPHONE ENCOUNTER
Requested Prescriptions     Pending Prescriptions Disp Refills    ferrous sulfate (IRON) 325 mg (65 mg iron) EC tablet 30 Tablet 2     Sig: Take 1 Tablet by mouth Daily (before breakfast) for 90 days.         Last Visit 4/8/22  Last Refill 8/9/21

## 2023-05-26 ENCOUNTER — OFFICE VISIT (OUTPATIENT)
Facility: CLINIC | Age: 33
End: 2023-05-26
Payer: COMMERCIAL

## 2023-05-26 VITALS
HEIGHT: 69 IN | BODY MASS INDEX: 31.84 KG/M2 | TEMPERATURE: 97.8 F | SYSTOLIC BLOOD PRESSURE: 118 MMHG | OXYGEN SATURATION: 99 % | RESPIRATION RATE: 16 BRPM | DIASTOLIC BLOOD PRESSURE: 72 MMHG | WEIGHT: 215 LBS | HEART RATE: 78 BPM

## 2023-05-26 DIAGNOSIS — R00.1 BRADYCARDIA: ICD-10-CM

## 2023-05-26 DIAGNOSIS — Z11.4 SCREENING FOR HIV (HUMAN IMMUNODEFICIENCY VIRUS): ICD-10-CM

## 2023-05-26 DIAGNOSIS — Z13.21 ENCOUNTER FOR VITAMIN DEFICIENCY SCREENING: ICD-10-CM

## 2023-05-26 DIAGNOSIS — Z87.09 HISTORY OF ASTHMA: ICD-10-CM

## 2023-05-26 DIAGNOSIS — E66.9 OBESITY WITH BODY MASS INDEX 30 OR GREATER: ICD-10-CM

## 2023-05-26 DIAGNOSIS — Z76.89 ENCOUNTER TO ESTABLISH CARE: Primary | ICD-10-CM

## 2023-05-26 DIAGNOSIS — Z87.898 HISTORY OF PREDIABETES: ICD-10-CM

## 2023-05-26 DIAGNOSIS — Z13.220 SCREENING FOR LIPID DISORDERS: ICD-10-CM

## 2023-05-26 PROBLEM — H53.022 REFRACTIVE AMBLYOPIA OF LEFT EYE: Status: ACTIVE | Noted: 2023-05-26

## 2023-05-26 PROBLEM — H69.80 EUSTACHIAN TUBE DYSFUNCTION: Status: ACTIVE | Noted: 2023-05-26

## 2023-05-26 PROBLEM — J30.9 ALLERGIC RHINITIS: Status: ACTIVE | Noted: 2023-05-26

## 2023-05-26 PROBLEM — D64.9 ANEMIA: Status: ACTIVE | Noted: 2023-05-26

## 2023-05-26 PROBLEM — H52.00 HYPEROPIA: Status: ACTIVE | Noted: 2023-05-26

## 2023-05-26 PROBLEM — H69.90 EUSTACHIAN TUBE DYSFUNCTION: Status: ACTIVE | Noted: 2023-05-26

## 2023-05-26 PROBLEM — H52.229 REGULAR ASTIGMATISM: Status: ACTIVE | Noted: 2023-05-26

## 2023-05-26 PROBLEM — H10.45 CHRONIC ALLERGIC CONJUNCTIVITIS: Status: ACTIVE | Noted: 2023-05-26

## 2023-05-26 PROBLEM — H53.032 STRABISMIC AMBLYOPIA OF LEFT EYE: Status: ACTIVE | Noted: 2023-05-26

## 2023-05-26 PROBLEM — R19.00 ABDOMINAL SWELLING: Status: ACTIVE | Noted: 2023-05-26

## 2023-05-26 PROBLEM — F43.22 ADJUSTMENT DISORDER WITH ANXIETY: Status: ACTIVE | Noted: 2023-05-26

## 2023-05-26 PROBLEM — R53.83 FATIGUE: Status: ACTIVE | Noted: 2023-05-26

## 2023-05-26 PROBLEM — H00.19 CHALAZION: Status: ACTIVE | Noted: 2023-05-26

## 2023-05-26 PROBLEM — K29.70 GASTRITIS: Status: ACTIVE | Noted: 2023-05-26

## 2023-05-26 PROBLEM — R06.00 NOCTURNAL DYSPNEA: Status: ACTIVE | Noted: 2023-05-26

## 2023-05-26 PROBLEM — G44.89 HEADACHE SYNDROME: Status: ACTIVE | Noted: 2023-05-26

## 2023-05-26 PROBLEM — J45.909 REACTIVE AIRWAY DISEASE: Status: ACTIVE | Noted: 2023-05-26

## 2023-05-26 LAB — SPECIMEN HOLD: NORMAL

## 2023-05-26 PROCEDURE — 93000 ELECTROCARDIOGRAM COMPLETE: CPT

## 2023-05-26 PROCEDURE — 99204 OFFICE O/P NEW MOD 45 MIN: CPT

## 2023-05-26 RX ORDER — ALBUTEROL SULFATE 90 UG/1
AEROSOL, METERED RESPIRATORY (INHALATION)
COMMUNITY

## 2023-05-26 SDOH — ECONOMIC STABILITY: FOOD INSECURITY: WITHIN THE PAST 12 MONTHS, YOU WORRIED THAT YOUR FOOD WOULD RUN OUT BEFORE YOU GOT MONEY TO BUY MORE.: NEVER TRUE

## 2023-05-26 SDOH — ECONOMIC STABILITY: INCOME INSECURITY: HOW HARD IS IT FOR YOU TO PAY FOR THE VERY BASICS LIKE FOOD, HOUSING, MEDICAL CARE, AND HEATING?: NOT VERY HARD

## 2023-05-26 SDOH — ECONOMIC STABILITY: HOUSING INSECURITY
IN THE LAST 12 MONTHS, WAS THERE A TIME WHEN YOU DID NOT HAVE A STEADY PLACE TO SLEEP OR SLEPT IN A SHELTER (INCLUDING NOW)?: NO

## 2023-05-26 SDOH — ECONOMIC STABILITY: FOOD INSECURITY: WITHIN THE PAST 12 MONTHS, THE FOOD YOU BOUGHT JUST DIDN'T LAST AND YOU DIDN'T HAVE MONEY TO GET MORE.: NEVER TRUE

## 2023-05-26 ASSESSMENT — PATIENT HEALTH QUESTIONNAIRE - PHQ9
SUM OF ALL RESPONSES TO PHQ QUESTIONS 1-9: 0
SUM OF ALL RESPONSES TO PHQ9 QUESTIONS 1 & 2: 0
SUM OF ALL RESPONSES TO PHQ QUESTIONS 1-9: 0
2. FEELING DOWN, DEPRESSED OR HOPELESS: 0
SUM OF ALL RESPONSES TO PHQ QUESTIONS 1-9: 0
SUM OF ALL RESPONSES TO PHQ QUESTIONS 1-9: 0
1. LITTLE INTEREST OR PLEASURE IN DOING THINGS: 0

## 2023-05-27 LAB
25(OH)D3 SERPL-MCNC: 33.7 NG/ML (ref 30–100)
ALBUMIN SERPL-MCNC: 3.9 G/DL (ref 3.5–5)
ALBUMIN/GLOB SERPL: 1.1 (ref 1.1–2.2)
ALP SERPL-CCNC: 50 U/L (ref 45–117)
ALT SERPL-CCNC: 25 U/L (ref 12–78)
ANION GAP SERPL CALC-SCNC: 8 MMOL/L (ref 5–15)
APPEARANCE UR: CLEAR
AST SERPL-CCNC: 22 U/L (ref 15–37)
BILIRUB SERPL-MCNC: 0.5 MG/DL (ref 0.2–1)
BILIRUB UR QL: NEGATIVE
BUN SERPL-MCNC: 10 MG/DL (ref 6–20)
BUN/CREAT SERPL: 12 (ref 12–20)
CALCIUM SERPL-MCNC: 9.3 MG/DL (ref 8.5–10.1)
CHLORIDE SERPL-SCNC: 106 MMOL/L (ref 97–108)
CHOLEST SERPL-MCNC: 135 MG/DL
CO2 SERPL-SCNC: 24 MMOL/L (ref 21–32)
COLOR UR: NORMAL
CREAT SERPL-MCNC: 0.83 MG/DL (ref 0.55–1.02)
ERYTHROCYTE [DISTWIDTH] IN BLOOD BY AUTOMATED COUNT: 22 % (ref 11.5–14.5)
EST. AVERAGE GLUCOSE BLD GHB EST-MCNC: 123 MG/DL
GLOBULIN SER CALC-MCNC: 3.7 G/DL (ref 2–4)
GLUCOSE SERPL-MCNC: 77 MG/DL (ref 65–100)
GLUCOSE UR STRIP.AUTO-MCNC: NEGATIVE MG/DL
HBA1C MFR BLD: 5.9 % (ref 4–5.6)
HCT VFR BLD AUTO: 39.6 % (ref 35–47)
HDLC SERPL-MCNC: 51 MG/DL
HDLC SERPL: 2.6 (ref 0–5)
HGB BLD-MCNC: 11.6 G/DL (ref 11.5–16)
HGB UR QL STRIP: NEGATIVE
HIV 1+2 AB+HIV1 P24 AG SERPL QL IA: NONREACTIVE
HIV 1/2 RESULT COMMENT: NORMAL
KETONES UR QL STRIP.AUTO: NEGATIVE MG/DL
LDLC SERPL CALC-MCNC: 68.2 MG/DL (ref 0–100)
LEUKOCYTE ESTERASE UR QL STRIP.AUTO: NEGATIVE
MCH RBC QN AUTO: 22.6 PG (ref 26–34)
MCHC RBC AUTO-ENTMCNC: 29.3 G/DL (ref 30–36.5)
MCV RBC AUTO: 77 FL (ref 80–99)
NITRITE UR QL STRIP.AUTO: NEGATIVE
NRBC # BLD: 0 K/UL (ref 0–0.01)
NRBC BLD-RTO: 0 PER 100 WBC
PH UR STRIP: 6.5 (ref 5–8)
PLATELET # BLD AUTO: 170 K/UL (ref 150–400)
POTASSIUM SERPL-SCNC: 4.3 MMOL/L (ref 3.5–5.1)
PROT SERPL-MCNC: 7.6 G/DL (ref 6.4–8.2)
PROT UR STRIP-MCNC: NEGATIVE MG/DL
RBC # BLD AUTO: 5.14 M/UL (ref 3.8–5.2)
SODIUM SERPL-SCNC: 138 MMOL/L (ref 136–145)
SP GR UR REFRACTOMETRY: 1.02 (ref 1–1.03)
TRIGL SERPL-MCNC: 79 MG/DL
TSH SERPL DL<=0.05 MIU/L-ACNC: 1.67 UIU/ML (ref 0.36–3.74)
UROBILINOGEN UR QL STRIP.AUTO: 1 EU/DL (ref 0.2–1)
VLDLC SERPL CALC-MCNC: 15.8 MG/DL
WBC # BLD AUTO: 5.9 K/UL (ref 3.6–11)

## 2023-06-02 ENCOUNTER — HOSPITAL ENCOUNTER (OUTPATIENT)
Facility: HOSPITAL | Age: 33
Discharge: HOME | End: 2023-06-02
Payer: COMMERCIAL

## 2023-06-02 DIAGNOSIS — R00.1 BRADYCARDIA: ICD-10-CM

## 2023-06-02 PROCEDURE — 93225 XTRNL ECG REC<48 HRS REC: CPT

## 2023-06-02 NOTE — PROGRESS NOTES
48 hour Holter Monitor placed on patient. Patient educated on device and its use. Patient to return the device to office after order duration. Contact information provided to the department for any questions. No questions at this time.      Holter Monitor # [id number]

## 2023-06-08 PROCEDURE — 93227 XTRNL ECG REC<48 HR R&I: CPT | Performed by: SPECIALIST

## 2024-11-02 SDOH — HEALTH STABILITY: PHYSICAL HEALTH: ON AVERAGE, HOW MANY DAYS PER WEEK DO YOU ENGAGE IN MODERATE TO STRENUOUS EXERCISE (LIKE A BRISK WALK)?: 5 DAYS

## 2024-11-02 SDOH — HEALTH STABILITY: PHYSICAL HEALTH: ON AVERAGE, HOW MANY MINUTES DO YOU ENGAGE IN EXERCISE AT THIS LEVEL?: 40 MIN

## 2024-11-04 ENCOUNTER — OFFICE VISIT (OUTPATIENT)
Age: 34
End: 2024-11-04
Payer: COMMERCIAL

## 2024-11-04 VITALS
HEIGHT: 69 IN | OXYGEN SATURATION: 100 % | DIASTOLIC BLOOD PRESSURE: 83 MMHG | HEART RATE: 83 BPM | WEIGHT: 227.8 LBS | BODY MASS INDEX: 33.74 KG/M2 | SYSTOLIC BLOOD PRESSURE: 118 MMHG | TEMPERATURE: 98 F | RESPIRATION RATE: 18 BRPM

## 2024-11-04 DIAGNOSIS — J45.20 MILD INTERMITTENT ASTHMA WITHOUT COMPLICATION: ICD-10-CM

## 2024-11-04 DIAGNOSIS — Z13.21 ENCOUNTER FOR VITAMIN DEFICIENCY SCREENING: ICD-10-CM

## 2024-11-04 DIAGNOSIS — Z00.00 WELL WOMAN EXAM WITHOUT GYNECOLOGICAL EXAM: Primary | ICD-10-CM

## 2024-11-04 DIAGNOSIS — R73.03 PREDIABETES: ICD-10-CM

## 2024-11-04 DIAGNOSIS — Z28.21 INFLUENZA VACCINATION DECLINED: ICD-10-CM

## 2024-11-04 DIAGNOSIS — Z28.21 PNEUMOCOCCAL VACCINATION DECLINED: ICD-10-CM

## 2024-11-04 PROCEDURE — 99385 PREV VISIT NEW AGE 18-39: CPT | Performed by: FAMILY MEDICINE

## 2024-11-04 RX ORDER — ALBUTEROL SULFATE 90 UG/1
2 INHALANT RESPIRATORY (INHALATION) EVERY 6 HOURS PRN
Qty: 18 G | Refills: 2 | Status: SHIPPED | OUTPATIENT
Start: 2024-11-04

## 2024-11-04 SDOH — ECONOMIC STABILITY: FOOD INSECURITY: WITHIN THE PAST 12 MONTHS, YOU WORRIED THAT YOUR FOOD WOULD RUN OUT BEFORE YOU GOT MONEY TO BUY MORE.: NEVER TRUE

## 2024-11-04 SDOH — ECONOMIC STABILITY: FOOD INSECURITY: WITHIN THE PAST 12 MONTHS, THE FOOD YOU BOUGHT JUST DIDN'T LAST AND YOU DIDN'T HAVE MONEY TO GET MORE.: NEVER TRUE

## 2024-11-04 SDOH — ECONOMIC STABILITY: INCOME INSECURITY: HOW HARD IS IT FOR YOU TO PAY FOR THE VERY BASICS LIKE FOOD, HOUSING, MEDICAL CARE, AND HEATING?: NOT HARD AT ALL

## 2024-11-04 ASSESSMENT — ENCOUNTER SYMPTOMS
ALLERGIC/IMMUNOLOGIC NEGATIVE: 1
RESPIRATORY NEGATIVE: 1
EYES NEGATIVE: 1
GASTROINTESTINAL NEGATIVE: 1

## 2024-11-04 ASSESSMENT — PATIENT HEALTH QUESTIONNAIRE - PHQ9
2. FEELING DOWN, DEPRESSED OR HOPELESS: NOT AT ALL
SUM OF ALL RESPONSES TO PHQ QUESTIONS 1-9: 0
1. LITTLE INTEREST OR PLEASURE IN DOING THINGS: NOT AT ALL
SUM OF ALL RESPONSES TO PHQ9 QUESTIONS 1 & 2: 0

## 2024-11-04 NOTE — PROGRESS NOTES
RM : 01    Chief Complaint   Patient presents with    Established New Doctor    Annual Exam   Fasting   No Flu vaccine   Vitals:    11/04/24 1025   BP: 118/83   Pulse: 83   Resp: 18   Temp: 98 °F (36.7 °C)   SpO2: 100%         \"Have you been to the ER, urgent care clinic since your last visit?  Hospitalized since your last visit?\"    NO    “Have you seen or consulted any other health care providers outside of [facility] since your last visit?”    Therapist      “Have you had a pap smear?”    Dr. Wanda Soria    No cervical cancer screening on file             Click Here for Release of Records Request      AVS  education, follow up, and recommendations provided and addressed with patient.   
used to authenticate this note.    --Bladimir Deras MD

## 2024-11-05 ENCOUNTER — PATIENT MESSAGE (OUTPATIENT)
Age: 34
End: 2024-11-05

## 2024-11-05 DIAGNOSIS — D64.9 ANEMIA, UNSPECIFIED TYPE: Primary | ICD-10-CM

## 2024-11-05 LAB
25(OH)D3 SERPL-MCNC: 29.2 NG/ML (ref 30–100)
ALBUMIN SERPL-MCNC: 3.9 G/DL (ref 3.5–5)
ALBUMIN/GLOB SERPL: 1 (ref 1.1–2.2)
ALP SERPL-CCNC: 65 U/L (ref 45–117)
ALT SERPL-CCNC: 18 U/L (ref 12–78)
ANION GAP SERPL CALC-SCNC: 7 MMOL/L (ref 2–12)
AST SERPL-CCNC: 13 U/L (ref 15–37)
BASOPHILS # BLD: 0 K/UL (ref 0–0.1)
BASOPHILS NFR BLD: 1 % (ref 0–1)
BILIRUB SERPL-MCNC: 0.3 MG/DL (ref 0.2–1)
BUN SERPL-MCNC: 7 MG/DL (ref 6–20)
BUN/CREAT SERPL: 8 (ref 12–20)
CALCIUM SERPL-MCNC: 9.1 MG/DL (ref 8.5–10.1)
CHLORIDE SERPL-SCNC: 107 MMOL/L (ref 97–108)
CHOLEST SERPL-MCNC: 142 MG/DL
CO2 SERPL-SCNC: 24 MMOL/L (ref 21–32)
CREAT SERPL-MCNC: 0.85 MG/DL (ref 0.55–1.02)
DIFFERENTIAL METHOD BLD: ABNORMAL
EOSINOPHIL # BLD: 0.2 K/UL (ref 0–0.4)
EOSINOPHIL NFR BLD: 3 % (ref 0–7)
ERYTHROCYTE [DISTWIDTH] IN BLOOD BY AUTOMATED COUNT: 19.1 % (ref 11.5–14.5)
EST. AVERAGE GLUCOSE BLD GHB EST-MCNC: 126 MG/DL
GLOBULIN SER CALC-MCNC: 3.8 G/DL (ref 2–4)
GLUCOSE SERPL-MCNC: 94 MG/DL (ref 65–100)
HBA1C MFR BLD: 6 % (ref 4–5.6)
HCT VFR BLD AUTO: 35.7 % (ref 35–47)
HDLC SERPL-MCNC: 44 MG/DL
HDLC SERPL: 3.2 (ref 0–5)
HGB BLD-MCNC: 10.4 G/DL (ref 11.5–16)
IMM GRANULOCYTES # BLD AUTO: 0 K/UL (ref 0–0.04)
IMM GRANULOCYTES NFR BLD AUTO: 0 % (ref 0–0.5)
LDLC SERPL CALC-MCNC: 73.6 MG/DL (ref 0–100)
LYMPHOCYTES # BLD: 1.4 K/UL (ref 0.8–3.5)
LYMPHOCYTES NFR BLD: 22 % (ref 12–49)
MCH RBC QN AUTO: 22 PG (ref 26–34)
MCHC RBC AUTO-ENTMCNC: 29.1 G/DL (ref 30–36.5)
MCV RBC AUTO: 75.5 FL (ref 80–99)
MONOCYTES # BLD: 0.4 K/UL (ref 0–1)
MONOCYTES NFR BLD: 6 % (ref 5–13)
NEUTS SEG # BLD: 4.6 K/UL (ref 1.8–8)
NEUTS SEG NFR BLD: 69 % (ref 32–75)
NRBC # BLD: 0 K/UL (ref 0–0.01)
NRBC BLD-RTO: 0 PER 100 WBC
PLATELET # BLD AUTO: 207 K/UL (ref 150–400)
POTASSIUM SERPL-SCNC: 4 MMOL/L (ref 3.5–5.1)
PROT SERPL-MCNC: 7.7 G/DL (ref 6.4–8.2)
RBC # BLD AUTO: 4.73 M/UL (ref 3.8–5.2)
SODIUM SERPL-SCNC: 138 MMOL/L (ref 136–145)
TRIGL SERPL-MCNC: 122 MG/DL
TSH SERPL DL<=0.05 MIU/L-ACNC: 1.66 UIU/ML (ref 0.36–3.74)
VLDLC SERPL CALC-MCNC: 24.4 MG/DL
WBC # BLD AUTO: 6.6 K/UL (ref 3.6–11)

## 2024-11-06 DIAGNOSIS — D64.9 ANEMIA, UNSPECIFIED TYPE: ICD-10-CM

## 2024-11-06 LAB
FERRITIN SERPL-MCNC: 3 NG/ML (ref 8–252)
IRON SATN MFR SERPL: 6 % (ref 20–50)
IRON SERPL-MCNC: 26 UG/DL (ref 35–150)
TIBC SERPL-MCNC: 431 UG/DL (ref 250–450)

## 2025-06-24 DIAGNOSIS — J45.20 MILD INTERMITTENT ASTHMA WITHOUT COMPLICATION: ICD-10-CM

## 2025-06-25 NOTE — TELEPHONE ENCOUNTER
Pt requested refill(s) via [employer]     Last appointment: 11/04/2024 MD Deras   Next appointment: Nothing scheduled   Previous refill encounter(s):   11/04/2024 Albuterol HFA INH #18 grams with 2 refills.     For Pharmacy Admin Tracking Only    Program: Medication Refill  Intervention Detail: New Rx: 1, reason: Patient Preference  Time Spent (min): 5    Requested Prescriptions     Pending Prescriptions Disp Refills    albuterol sulfate HFA (PROVENTIL;VENTOLIN;PROAIR) 108 (90 Base) MCG/ACT inhaler 18 g 2     Sig: Inhale 2 puffs into the lungs every 6 hours as needed for Wheezing or Shortness of Breath

## 2025-06-26 RX ORDER — ALBUTEROL SULFATE 90 UG/1
2 INHALANT RESPIRATORY (INHALATION) EVERY 6 HOURS PRN
Qty: 18 G | Refills: 2 | Status: SHIPPED | OUTPATIENT
Start: 2025-06-26

## 2025-07-09 ENCOUNTER — OFFICE VISIT (OUTPATIENT)
Age: 35
End: 2025-07-09
Payer: COMMERCIAL

## 2025-07-09 VITALS
WEIGHT: 218 LBS | OXYGEN SATURATION: 100 % | HEIGHT: 69 IN | RESPIRATION RATE: 16 BRPM | HEART RATE: 95 BPM | SYSTOLIC BLOOD PRESSURE: 110 MMHG | TEMPERATURE: 98.3 F | DIASTOLIC BLOOD PRESSURE: 78 MMHG | BODY MASS INDEX: 32.29 KG/M2

## 2025-07-09 DIAGNOSIS — G62.9 NEUROPATHY: Primary | ICD-10-CM

## 2025-07-09 DIAGNOSIS — R73.03 PREDIABETES: ICD-10-CM

## 2025-07-09 DIAGNOSIS — G62.9 NEUROPATHY: ICD-10-CM

## 2025-07-09 DIAGNOSIS — D50.8 IRON DEFICIENCY ANEMIA SECONDARY TO INADEQUATE DIETARY IRON INTAKE: ICD-10-CM

## 2025-07-09 PROCEDURE — 99214 OFFICE O/P EST MOD 30 MIN: CPT | Performed by: FAMILY MEDICINE

## 2025-07-09 RX ORDER — PNV NO.95/FERROUS FUM/FOLIC AC 28MG-0.8MG
TABLET ORAL
COMMUNITY

## 2025-07-09 SDOH — ECONOMIC STABILITY: FOOD INSECURITY: WITHIN THE PAST 12 MONTHS, THE FOOD YOU BOUGHT JUST DIDN'T LAST AND YOU DIDN'T HAVE MONEY TO GET MORE.: NEVER TRUE

## 2025-07-09 SDOH — ECONOMIC STABILITY: FOOD INSECURITY: WITHIN THE PAST 12 MONTHS, YOU WORRIED THAT YOUR FOOD WOULD RUN OUT BEFORE YOU GOT MONEY TO BUY MORE.: NEVER TRUE

## 2025-07-09 ASSESSMENT — PATIENT HEALTH QUESTIONNAIRE - PHQ9
1. LITTLE INTEREST OR PLEASURE IN DOING THINGS: NOT AT ALL
SUM OF ALL RESPONSES TO PHQ QUESTIONS 1-9: 0
2. FEELING DOWN, DEPRESSED OR HOPELESS: NOT AT ALL

## 2025-07-09 NOTE — PATIENT INSTRUCTIONS
You can try neuriva over the counter which has alpha lipoic acid to help with nerve pain.   Schedule an appt for a lab only visit

## 2025-07-09 NOTE — PROGRESS NOTES
RM:    Chief Complaint   Patient presents with    Acute Visit       Fasting No    There were no vitals filed for this visit.     Have you been to the ER, urgent care clinic since your last visit?  Hospitalized since your last visit?   YES - When: approximately 3 months ago.  Where and Why: [facility] for inflamed ribs and feels like having chest pains.    Have you seen or consulted any other health care providers outside our system since your last visit?   NO              Click Here for Release of Records Request   AVS  education, follow up, and recommendations provided and addressed with patient.  services used to advise patient

## 2025-07-10 DIAGNOSIS — R73.03 PREDIABETES: ICD-10-CM

## 2025-07-10 DIAGNOSIS — G62.9 NEUROPATHY: ICD-10-CM

## 2025-07-10 DIAGNOSIS — D50.8 IRON DEFICIENCY ANEMIA SECONDARY TO INADEQUATE DIETARY IRON INTAKE: ICD-10-CM

## 2025-07-10 NOTE — PROGRESS NOTES
Koby Titus (:  1990) is a 35 y.o. female,Established patient, here for evaluation of the following chief complaint(s):  Acute Visit (Patient states that she has been having tingling and pain in the hands, arms, and lower legs for about 2 months. Patient states that it has been continuing more consistently lately. Patient states that no matter what she eats about an hour later she is getting lightheaded for 2 weeks. Patient states she has been taking a muscle relaxer for chest tightness but doesn't remember what the name is. )      Assessment & Plan   ASSESSMENT/PLAN:  Assessment & Plan  1. Numbness and tingling. New, acute, unclear etiology.  Will check labs for secondary causes.    - Reports numbness and tingling in the right foot, hands, and upper part of the arm, ongoing for 2 months, exacerbated by driving.  - Reflexes are 2+ in both arms and legs; mild thoracic spine tenderness noted.  - Hemoglobin A1c was 6.0 on 2024; thyroid test was normal at 1.66.  - Advised to try Nervive, an over-the-counter supplement containing alpha-lipoic acid, for nerve pain; lab-only visit scheduled.    2. Iron deficiency anemia due to inadequate dietary intake.  Chronic, unstable, not at goal.    - Currently taking iron supplements.  - Iron deficiency anemia noted; advised to continue current regimen.  - Blood work will be conducted to monitor iron levels.    Follow-up  - A follow-up appointment is scheduled for 3 months from now.    1. Neuropathy  -     Vitamin B12; Future  -     Hemoglobin A1C; Future  -     Sedimentation Rate; Future  -     C-Reactive Protein; Future  -     Gammopathy Eval, SPEP/GISELA, IG Qt/FLC; Future  -     Protein Electrophoresis, Urine; Future  -     [facility] Comprehensive Plus Panel; Future  -     Basic Metabolic Panel; Future  2. Prediabetes  -     Hemoglobin A1C; Future  -     Basic Metabolic Panel; Future  3. Iron deficiency anemia secondary to inadequate dietary iron intake  -     CBC

## 2025-07-11 LAB
BASOPHILS # BLD AUTO: 0 X10E3/UL (ref 0–0.2)
BASOPHILS NFR BLD AUTO: 0 %
BUN SERPL-MCNC: 11 MG/DL (ref 6–20)
BUN/CREAT SERPL: 14 (ref 9–23)
CALCIUM SERPL-MCNC: 9.1 MG/DL (ref 8.7–10.2)
CENTROMERE B AB SER-ACNC: <0.2 AI (ref 0–0.9)
CHLORIDE SERPL-SCNC: 103 MMOL/L (ref 96–106)
CHROMATIN AB SERPL-ACNC: <0.2 AI (ref 0–0.9)
CO2 SERPL-SCNC: 17 MMOL/L (ref 20–29)
CREAT SERPL-MCNC: 0.8 MG/DL (ref 0.57–1)
CRP SERPL-MCNC: 6 MG/L (ref 0–10)
DSDNA AB SER-ACNC: 3 IU/ML (ref 0–9)
ENA JO1 AB SER-ACNC: <0.2 AI (ref 0–0.9)
ENA RNP AB SER-ACNC: 0.4 AI (ref 0–0.9)
ENA SCL70 AB SER-ACNC: <0.2 AI (ref 0–0.9)
ENA SM AB SER-ACNC: <0.2 AI (ref 0–0.9)
ENA SM+RNP AB SER-ACNC: <0.2 AI (ref 0–0.9)
ENA SS-A AB SER-ACNC: <0.2 AI (ref 0–0.9)
ENA SS-B AB SER-ACNC: <0.2 AI (ref 0–0.9)
EOSINOPHIL # BLD AUTO: 0 X10E3/UL (ref 0–0.4)
EOSINOPHIL NFR BLD AUTO: 1 %
ERYTHROCYTE [DISTWIDTH] IN BLOOD BY AUTOMATED COUNT: 18.6 % (ref 11.7–15.4)
ERYTHROCYTE [SEDIMENTATION RATE] IN BLOOD BY WESTERGREN METHOD: 57 MM/HR (ref 0–32)
EST. AVERAGE GLUCOSE BLD GHB EST-MCNC: 128 MG/DL
FERRITIN SERPL-MCNC: 8 NG/ML (ref 15–150)
GLUCOSE SERPL-MCNC: 97 MG/DL (ref 70–99)
HBA1C MFR BLD: 6.1 % (ref 4.8–5.6)
HCT VFR BLD AUTO: 35.8 % (ref 34–46.6)
HGB BLD-MCNC: 10.2 G/DL (ref 11.1–15.9)
IMM GRANULOCYTES # BLD AUTO: 0 X10E3/UL (ref 0–0.1)
IMM GRANULOCYTES NFR BLD AUTO: 0 %
IRON SATN MFR SERPL: 9 % (ref 15–55)
IRON SERPL-MCNC: 35 UG/DL (ref 27–159)
LYMPHOCYTES # BLD AUTO: 1.5 X10E3/UL (ref 0.7–3.1)
LYMPHOCYTES NFR BLD AUTO: 23 %
Lab: NORMAL
MCH RBC QN AUTO: 22.2 PG (ref 26.6–33)
MCHC RBC AUTO-ENTMCNC: 28.5 G/DL (ref 31.5–35.7)
MCV RBC AUTO: 78 FL (ref 79–97)
MONOCYTES # BLD AUTO: 0.4 X10E3/UL (ref 0.1–0.9)
MONOCYTES NFR BLD AUTO: 7 %
NEUTROPHILS # BLD AUTO: 4.4 X10E3/UL (ref 1.4–7)
NEUTROPHILS NFR BLD AUTO: 69 %
PLATELET # BLD AUTO: 239 X10E3/UL (ref 150–450)
POTASSIUM SERPL-SCNC: 4.3 MMOL/L (ref 3.5–5.2)
RBC # BLD AUTO: 4.59 X10E6/UL (ref 3.77–5.28)
RIBOSOMAL P AB SER-ACNC: <0.2 AI (ref 0–0.9)
SODIUM SERPL-SCNC: 139 MMOL/L (ref 134–144)
TIBC SERPL-MCNC: 411 UG/DL (ref 250–450)
UIBC SERPL-MCNC: 376 UG/DL (ref 131–425)
VIT B12 SERPL-MCNC: 775 PG/ML (ref 232–1245)
WBC # BLD AUTO: 6.4 X10E3/UL (ref 3.4–10.8)

## 2025-07-15 LAB
ALBUMIN MFR UR ELPH: 60.8 %
ALBUMIN SERPL ELPH-MCNC: 3.5 G/DL (ref 2.9–4.4)
ALBUMIN/GLOB SERPL: 1 {RATIO} (ref 0.7–1.7)
ALPHA1 GLOB MFR UR ELPH: 5 %
ALPHA1 GLOB SERPL ELPH-MCNC: 0.2 G/DL (ref 0–0.4)
ALPHA2 GLOB MFR UR ELPH: 9.7 %
ALPHA2 GLOB SERPL ELPH-MCNC: 0.8 G/DL (ref 0.4–1)
B-GLOBULIN MFR UR ELPH: 14.5 %
B-GLOBULIN SERPL ELPH-MCNC: 1.2 G/DL (ref 0.7–1.3)
GAMMA GLOB MFR UR ELPH: 10 %
GAMMA GLOB SERPL ELPH-MCNC: 1.3 G/DL (ref 0.4–1.8)
GLOBULIN SER-MCNC: 3.6 G/DL (ref 2.2–3.9)
IGA SERPL-MCNC: 198 MG/DL (ref 87–352)
IGG SERPL-MCNC: 1301 MG/DL (ref 586–1602)
IGM SERPL-MCNC: 164 MG/DL (ref 26–217)
INTERPRETATION SERPL IEP-IMP: ABNORMAL
KAPPA LC FREE SER-MCNC: 21.8 MG/L (ref 3.3–19.4)
KAPPA LC FREE/LAMBDA FREE SER: 1.39 {RATIO} (ref 0.26–1.65)
LABORATORY COMMENT REPORT: ABNORMAL
LABORATORY COMMENT REPORT: NORMAL
LAMBDA LC FREE SERPL-MCNC: 15.7 MG/L (ref 5.7–26.3)
M PROTEIN MFR UR ELPH: NORMAL %
M PROTEIN SERPL ELPH-MCNC: ABNORMAL G/DL
PROT SERPL-MCNC: 7.1 G/DL (ref 6–8.5)
PROT UR-MCNC: 27.8 MG/DL

## 2025-07-16 ENCOUNTER — TELEPHONE (OUTPATIENT)
Age: 35
End: 2025-07-16

## 2025-07-16 DIAGNOSIS — D50.8 IRON DEFICIENCY ANEMIA SECONDARY TO INADEQUATE DIETARY IRON INTAKE: Primary | ICD-10-CM

## 2025-07-16 NOTE — TELEPHONE ENCOUNTER
Spoke with pt to discuss her concerns, results, and her appt.  Pt states that she has started working with a nutritionist since seeing her A1C results.  Discussed with pt the persistent JOSE despite oral iron.  She states that higher doses result in GI SE.  Pt amenable to hematology referral to discuss IV iron infusions.  No further questions.     Plan: Patient in Plaquenil needs Visual field exam Detail Level: Zone Continue Regimen: Plaquenil as prescribed

## 2025-07-17 ENCOUNTER — TELEPHONE (OUTPATIENT)
Age: 35
End: 2025-07-17

## 2025-07-17 NOTE — TELEPHONE ENCOUNTER
Patient called and stated that she would like to schedule NP appt. Requested a call back.     # [phone]

## 2025-07-27 ENCOUNTER — RESULTS FOLLOW-UP (OUTPATIENT)
Age: 35
End: 2025-07-27

## 2025-07-27 NOTE — RESULT ENCOUNTER NOTE
Letter:  all of your labs are within acceptable limits except:  1. You have iron deficiency anemia.  Keep the routinely scheduled appt with hematology.   2.  One of the markers for inflammation was elevated, although the remaining tests were negative.  This is non specific so keep your routinely scheduled appt to discuss additional testing if needed.  In the meantime, you can try to increase anti inflammatory foods such as cinnamon, blueberries, tumeric, alexi, vitamin D, etc.    Keep your routinely scheduled appts. Have a great rest of the summer.

## 2025-08-07 ENCOUNTER — TELEMEDICINE (OUTPATIENT)
Age: 35
End: 2025-08-07
Payer: COMMERCIAL

## 2025-08-07 DIAGNOSIS — D50.0 IRON DEFICIENCY ANEMIA DUE TO CHRONIC BLOOD LOSS: ICD-10-CM

## 2025-08-07 DIAGNOSIS — D50.0 IRON DEFICIENCY ANEMIA DUE TO CHRONIC BLOOD LOSS: Primary | ICD-10-CM

## 2025-08-07 DIAGNOSIS — R23.3 EASY BRUISABILITY: Primary | ICD-10-CM

## 2025-08-07 PROBLEM — D50.9 IRON DEFICIENCY ANEMIA: Status: ACTIVE | Noted: 2025-08-07

## 2025-08-07 PROBLEM — D50.8 IRON DEFICIENCY ANEMIA REFRACTORY TO IRON THERAPY: Status: ACTIVE | Noted: 2025-08-07

## 2025-08-07 PROCEDURE — 99203 OFFICE O/P NEW LOW 30 MIN: CPT | Performed by: STUDENT IN AN ORGANIZED HEALTH CARE EDUCATION/TRAINING PROGRAM

## 2025-08-07 RX ORDER — SODIUM CHLORIDE 9 MG/ML
INJECTION, SOLUTION INTRAVENOUS PRN
OUTPATIENT
Start: 2025-08-11

## 2025-08-07 RX ORDER — ACETAMINOPHEN 325 MG/1
650 TABLET ORAL
OUTPATIENT
Start: 2025-08-11

## 2025-08-07 RX ORDER — SODIUM CHLORIDE 9 MG/ML
5-250 INJECTION, SOLUTION INTRAVENOUS PRN
OUTPATIENT
Start: 2025-08-11

## 2025-08-07 RX ORDER — ALBUTEROL SULFATE 90 UG/1
4 INHALANT RESPIRATORY (INHALATION) PRN
OUTPATIENT
Start: 2025-08-11

## 2025-08-07 RX ORDER — HEPARIN 100 UNIT/ML
500 SYRINGE INTRAVENOUS PRN
OUTPATIENT
Start: 2025-08-11

## 2025-08-07 RX ORDER — HYDROCORTISONE SODIUM SUCCINATE 100 MG/2ML
100 INJECTION INTRAMUSCULAR; INTRAVENOUS
OUTPATIENT
Start: 2025-08-11

## 2025-08-07 RX ORDER — ONDANSETRON 2 MG/ML
8 INJECTION INTRAMUSCULAR; INTRAVENOUS
OUTPATIENT
Start: 2025-08-11

## 2025-08-07 RX ORDER — EPINEPHRINE 1 MG/ML
0.3 INJECTION, SOLUTION, CONCENTRATE INTRAVENOUS PRN
OUTPATIENT
Start: 2025-08-11

## 2025-08-07 RX ORDER — DIPHENHYDRAMINE HYDROCHLORIDE 50 MG/ML
50 INJECTION, SOLUTION INTRAMUSCULAR; INTRAVENOUS
OUTPATIENT
Start: 2025-08-11

## 2025-08-07 RX ORDER — SODIUM CHLORIDE 0.9 % (FLUSH) 0.9 %
5-40 SYRINGE (ML) INJECTION PRN
OUTPATIENT
Start: 2025-08-11

## 2025-08-08 ENCOUNTER — TELEPHONE (OUTPATIENT)
Age: 35
End: 2025-08-08

## 2025-08-08 DIAGNOSIS — D50.0 IRON DEFICIENCY ANEMIA DUE TO CHRONIC BLOOD LOSS: ICD-10-CM

## 2025-08-08 DIAGNOSIS — R23.3 EASY BRUISABILITY: ICD-10-CM

## 2025-08-09 LAB
APTT PPP: 30.2 SEC (ref 22.1–31)
BASOPHILS # BLD: 0.03 K/UL (ref 0–0.1)
BASOPHILS NFR BLD: 0.5 % (ref 0–1)
DIFFERENTIAL METHOD BLD: ABNORMAL
EOSINOPHIL # BLD: 0.09 K/UL (ref 0–0.4)
EOSINOPHIL NFR BLD: 1.5 % (ref 0–7)
ERYTHROCYTE [DISTWIDTH] IN BLOOD BY AUTOMATED COUNT: 19.2 % (ref 11.5–14.5)
FERRITIN SERPL-MCNC: 8 NG/ML (ref 13–252)
FIBRINOGEN PPP-MCNC: 446 MG/DL (ref 200–475)
HCT VFR BLD AUTO: 36.6 % (ref 35–47)
HGB BLD-MCNC: 10.6 G/DL (ref 11.5–16)
IMM GRANULOCYTES # BLD AUTO: 0.02 K/UL (ref 0–0.04)
IMM GRANULOCYTES NFR BLD AUTO: 0.3 % (ref 0–0.5)
INR PPP: 1 (ref 0.9–1.1)
IRON SATN MFR SERPL: 10 %
IRON SERPL-MCNC: 37 UG/DL (ref 37–145)
LYMPHOCYTES # BLD: 1.53 K/UL (ref 0.8–3.5)
LYMPHOCYTES NFR BLD: 25.7 % (ref 12–49)
MCH RBC QN AUTO: 22.4 PG (ref 26–34)
MCHC RBC AUTO-ENTMCNC: 29 G/DL (ref 30–36.5)
MCV RBC AUTO: 77.2 FL (ref 80–99)
MONOCYTES # BLD: 0.55 K/UL (ref 0–1)
MONOCYTES NFR BLD: 9.2 % (ref 5–13)
NEUTS SEG # BLD: 3.73 K/UL (ref 1.8–8)
NEUTS SEG NFR BLD: 62.8 % (ref 32–75)
NRBC # BLD: 0 K/UL (ref 0–0.01)
NRBC BLD-RTO: 0 PER 100 WBC
PERIPHERAL SMEAR, MD REVIEW: NORMAL
PLATELET # BLD AUTO: 176 K/UL (ref 150–400)
PROTHROMBIN TIME: 11 SEC (ref 9.2–11.2)
RBC # BLD AUTO: 4.74 M/UL (ref 3.8–5.2)
RETICS # AUTO: 0.06 M/UL (ref 0.02–0.08)
RETICS/RBC NFR AUTO: 1.4 % (ref 0.7–2.1)
THERAPEUTIC RANGE: NORMAL SECS (ref 58–77)
TIBC SERPL-MCNC: 392 UG/DL (ref 250–450)
UIBC SERPL-MCNC: 355 UG/DL (ref 112–347)
WBC # BLD AUTO: 6 K/UL (ref 3.6–11)

## 2025-08-12 LAB
ENDOMYSIUM IGA SER QL: NEGATIVE
IGA SERPL-MCNC: 197 MG/DL (ref 87–352)
TTG IGA SER-ACNC: <2 U/ML (ref 0–3)

## 2025-08-13 LAB
FACT VIII ACT/NOR PPP: 97 % (ref 56–140)
INTERPRETATION: NORMAL
VWF AG ACT/NOR PPP IA: 103 % (ref 50–200)
VWF:RCO ACT/NOR PPP PL AGG: 138 % (ref 50–200)

## 2025-08-19 ENCOUNTER — RESULTS FOLLOW-UP (OUTPATIENT)
Age: 35
End: 2025-08-19

## 2025-08-19 ENCOUNTER — HOSPITAL ENCOUNTER (OUTPATIENT)
Facility: HOSPITAL | Age: 35
Setting detail: INFUSION SERIES
End: 2025-08-19